# Patient Record
Sex: FEMALE | Race: WHITE | NOT HISPANIC OR LATINO
[De-identification: names, ages, dates, MRNs, and addresses within clinical notes are randomized per-mention and may not be internally consistent; named-entity substitution may affect disease eponyms.]

---

## 2017-03-01 ENCOUNTER — APPOINTMENT (OUTPATIENT)
Dept: MAMMOGRAPHY | Facility: CLINIC | Age: 62
End: 2017-03-01

## 2017-03-01 ENCOUNTER — APPOINTMENT (OUTPATIENT)
Dept: ULTRASOUND IMAGING | Facility: CLINIC | Age: 62
End: 2017-03-01

## 2017-03-01 ENCOUNTER — OUTPATIENT (OUTPATIENT)
Dept: OUTPATIENT SERVICES | Facility: HOSPITAL | Age: 62
LOS: 1 days | End: 2017-03-01
Payer: COMMERCIAL

## 2017-03-01 DIAGNOSIS — Z00.8 ENCOUNTER FOR OTHER GENERAL EXAMINATION: ICD-10-CM

## 2017-03-01 PROCEDURE — 76641 ULTRASOUND BREAST COMPLETE: CPT

## 2017-03-01 PROCEDURE — 77063 BREAST TOMOSYNTHESIS BI: CPT

## 2017-03-01 PROCEDURE — 77067 SCR MAMMO BI INCL CAD: CPT

## 2017-05-30 ENCOUNTER — OUTPATIENT (OUTPATIENT)
Dept: OUTPATIENT SERVICES | Facility: HOSPITAL | Age: 62
LOS: 1 days | End: 2017-05-30
Payer: COMMERCIAL

## 2017-05-30 ENCOUNTER — APPOINTMENT (OUTPATIENT)
Dept: RADIOLOGY | Facility: CLINIC | Age: 62
End: 2017-05-30

## 2017-05-30 DIAGNOSIS — Z00.8 ENCOUNTER FOR OTHER GENERAL EXAMINATION: ICD-10-CM

## 2017-05-30 PROCEDURE — 77080 DXA BONE DENSITY AXIAL: CPT

## 2017-05-30 PROCEDURE — 77086 VRT FRACTURE ASSMT VIA DXA: CPT

## 2017-06-06 ENCOUNTER — OUTPATIENT (OUTPATIENT)
Dept: OUTPATIENT SERVICES | Facility: HOSPITAL | Age: 62
LOS: 1 days | End: 2017-06-06
Payer: COMMERCIAL

## 2017-06-06 ENCOUNTER — APPOINTMENT (OUTPATIENT)
Dept: RADIOLOGY | Facility: CLINIC | Age: 62
End: 2017-06-06

## 2017-06-06 DIAGNOSIS — Z00.8 ENCOUNTER FOR OTHER GENERAL EXAMINATION: ICD-10-CM

## 2017-06-06 PROCEDURE — 71110 X-RAY EXAM RIBS BIL 3 VIEWS: CPT

## 2017-06-06 PROCEDURE — 71046 X-RAY EXAM CHEST 2 VIEWS: CPT

## 2017-06-12 DIAGNOSIS — Z00.00 ENCOUNTER FOR GENERAL ADULT MEDICAL EXAMINATION WITHOUT ABNORMAL FINDINGS: ICD-10-CM

## 2017-06-12 DIAGNOSIS — M81.0 AGE-RELATED OSTEOPOROSIS WITHOUT CURRENT PATHOLOGICAL FRACTURE: ICD-10-CM

## 2017-06-12 DIAGNOSIS — R07.81 PLEURODYNIA: ICD-10-CM

## 2017-07-20 ENCOUNTER — OUTPATIENT (OUTPATIENT)
Dept: OUTPATIENT SERVICES | Facility: HOSPITAL | Age: 62
LOS: 1 days | Discharge: HOME | End: 2017-07-20
Payer: COMMERCIAL

## 2017-07-20 ENCOUNTER — RESULT REVIEW (OUTPATIENT)
Age: 62
End: 2017-07-20

## 2017-07-20 VITALS
TEMPERATURE: 97 F | WEIGHT: 110.89 LBS | DIASTOLIC BLOOD PRESSURE: 77 MMHG | SYSTOLIC BLOOD PRESSURE: 123 MMHG | OXYGEN SATURATION: 100 % | HEIGHT: 62 IN | RESPIRATION RATE: 16 BRPM | HEART RATE: 70 BPM

## 2017-07-20 DIAGNOSIS — Z98.890 OTHER SPECIFIED POSTPROCEDURAL STATES: Chronic | ICD-10-CM

## 2017-07-20 DIAGNOSIS — Z90.710 ACQUIRED ABSENCE OF BOTH CERVIX AND UTERUS: Chronic | ICD-10-CM

## 2017-07-20 PROCEDURE — 88305 TISSUE EXAM BY PATHOLOGIST: CPT | Mod: 26

## 2017-07-20 RX ORDER — SODIUM CHLORIDE 9 MG/ML
1000 INJECTION INTRAMUSCULAR; INTRAVENOUS; SUBCUTANEOUS
Qty: 0 | Refills: 0 | Status: DISCONTINUED | OUTPATIENT
Start: 2017-07-20 | End: 2017-08-04

## 2017-07-20 RX ORDER — FLUTICASONE PROPIONATE AND SALMETEROL 50; 250 UG/1; UG/1
1 POWDER ORAL; RESPIRATORY (INHALATION)
Qty: 0 | Refills: 0 | COMMUNITY

## 2017-07-20 RX ADMIN — SODIUM CHLORIDE 75 MILLILITER(S): 9 INJECTION INTRAMUSCULAR; INTRAVENOUS; SUBCUTANEOUS at 08:26

## 2017-07-20 NOTE — ASU PATIENT PROFILE, ADULT - VISION (WITH CORRECTIVE LENSES IF THE PATIENT USUALLY WEARS THEM):
reading and driving glasses/Normal vision: sees adequately in most situations; can see medication labels, newsprint

## 2017-07-21 LAB — SURGICAL PATHOLOGY FINAL REPORT - CH: SIGNIFICANT CHANGE UP

## 2017-07-25 DIAGNOSIS — Z88.5 ALLERGY STATUS TO NARCOTIC AGENT: ICD-10-CM

## 2017-07-25 DIAGNOSIS — K59.00 CONSTIPATION, UNSPECIFIED: ICD-10-CM

## 2017-07-25 DIAGNOSIS — Z87.891 PERSONAL HISTORY OF NICOTINE DEPENDENCE: ICD-10-CM

## 2017-07-25 DIAGNOSIS — J45.909 UNSPECIFIED ASTHMA, UNCOMPLICATED: ICD-10-CM

## 2017-07-25 DIAGNOSIS — D12.0 BENIGN NEOPLASM OF CECUM: ICD-10-CM

## 2017-07-25 DIAGNOSIS — J44.9 CHRONIC OBSTRUCTIVE PULMONARY DISEASE, UNSPECIFIED: ICD-10-CM

## 2017-07-25 DIAGNOSIS — G43.909 MIGRAINE, UNSPECIFIED, NOT INTRACTABLE, WITHOUT STATUS MIGRAINOSUS: ICD-10-CM

## 2017-07-25 DIAGNOSIS — K64.8 OTHER HEMORRHOIDS: ICD-10-CM

## 2017-09-08 ENCOUNTER — APPOINTMENT (OUTPATIENT)
Dept: COLORECTAL SURGERY | Facility: CLINIC | Age: 62
End: 2017-09-08
Payer: COMMERCIAL

## 2017-09-08 VITALS
BODY MASS INDEX: 19.69 KG/M2 | DIASTOLIC BLOOD PRESSURE: 69 MMHG | HEIGHT: 62 IN | RESPIRATION RATE: 14 BRPM | WEIGHT: 107 LBS | HEART RATE: 67 BPM | SYSTOLIC BLOOD PRESSURE: 102 MMHG | TEMPERATURE: 97.2 F

## 2017-09-08 DIAGNOSIS — K90.49 MALABSORPTION DUE TO INTOLERANCE, NOT ELSEWHERE CLASSIFIED: ICD-10-CM

## 2017-09-08 DIAGNOSIS — Z87.19 PERSONAL HISTORY OF OTHER DISEASES OF THE DIGESTIVE SYSTEM: ICD-10-CM

## 2017-09-08 PROCEDURE — 46221 LIGATION OF HEMORRHOID(S): CPT

## 2017-09-08 PROCEDURE — 99244 OFF/OP CNSLTJ NEW/EST MOD 40: CPT | Mod: 25

## 2017-09-08 RX ORDER — RIZATRIPTAN BENZOATE 10 MG/1
10 TABLET, ORALLY DISINTEGRATING ORAL
Qty: 10 | Refills: 0 | Status: ACTIVE | COMMUNITY
Start: 2017-07-31

## 2017-09-08 RX ORDER — LEVALBUTEROL HYDROCHLORIDE 0.63 MG/3ML
0.63 SOLUTION RESPIRATORY (INHALATION)
Qty: 72 | Refills: 0 | Status: ACTIVE | COMMUNITY
Start: 2017-05-15

## 2017-09-26 ENCOUNTER — APPOINTMENT (OUTPATIENT)
Dept: COLORECTAL SURGERY | Facility: CLINIC | Age: 62
End: 2017-09-26
Payer: COMMERCIAL

## 2017-09-26 VITALS
DIASTOLIC BLOOD PRESSURE: 70 MMHG | RESPIRATION RATE: 14 BRPM | HEART RATE: 70 BPM | SYSTOLIC BLOOD PRESSURE: 110 MMHG | BODY MASS INDEX: 19.69 KG/M2 | TEMPERATURE: 97.1 F | HEIGHT: 62 IN | WEIGHT: 107 LBS

## 2017-09-26 PROCEDURE — 46221 LIGATION OF HEMORRHOID(S): CPT

## 2017-09-26 PROCEDURE — 99214 OFFICE O/P EST MOD 30 MIN: CPT | Mod: 25

## 2018-04-21 ENCOUNTER — APPOINTMENT (OUTPATIENT)
Dept: RADIOLOGY | Facility: CLINIC | Age: 63
End: 2018-04-21
Payer: COMMERCIAL

## 2018-04-21 ENCOUNTER — OUTPATIENT (OUTPATIENT)
Dept: OUTPATIENT SERVICES | Facility: HOSPITAL | Age: 63
LOS: 1 days | End: 2018-04-21
Payer: COMMERCIAL

## 2018-04-21 DIAGNOSIS — Z98.890 OTHER SPECIFIED POSTPROCEDURAL STATES: Chronic | ICD-10-CM

## 2018-04-21 DIAGNOSIS — Z00.8 ENCOUNTER FOR OTHER GENERAL EXAMINATION: ICD-10-CM

## 2018-04-21 DIAGNOSIS — Z90.710 ACQUIRED ABSENCE OF BOTH CERVIX AND UTERUS: Chronic | ICD-10-CM

## 2018-04-21 PROCEDURE — 71046 X-RAY EXAM CHEST 2 VIEWS: CPT | Mod: 26

## 2018-04-21 PROCEDURE — 71046 X-RAY EXAM CHEST 2 VIEWS: CPT

## 2018-04-26 ENCOUNTER — APPOINTMENT (OUTPATIENT)
Dept: ULTRASOUND IMAGING | Facility: CLINIC | Age: 63
End: 2018-04-26
Payer: COMMERCIAL

## 2018-04-26 ENCOUNTER — OUTPATIENT (OUTPATIENT)
Dept: OUTPATIENT SERVICES | Facility: HOSPITAL | Age: 63
LOS: 1 days | End: 2018-04-26
Payer: COMMERCIAL

## 2018-04-26 ENCOUNTER — APPOINTMENT (OUTPATIENT)
Dept: MAMMOGRAPHY | Facility: CLINIC | Age: 63
End: 2018-04-26
Payer: COMMERCIAL

## 2018-04-26 DIAGNOSIS — R10.32 LEFT LOWER QUADRANT PAIN: ICD-10-CM

## 2018-04-26 DIAGNOSIS — R92.2 INCONCLUSIVE MAMMOGRAM: ICD-10-CM

## 2018-04-26 DIAGNOSIS — Z12.31 ENCOUNTER FOR SCREENING MAMMOGRAM FOR MALIGNANT NEOPLASM OF BREAST: ICD-10-CM

## 2018-04-26 DIAGNOSIS — Z00.8 ENCOUNTER FOR OTHER GENERAL EXAMINATION: ICD-10-CM

## 2018-04-26 DIAGNOSIS — Z90.710 ACQUIRED ABSENCE OF BOTH CERVIX AND UTERUS: Chronic | ICD-10-CM

## 2018-04-26 DIAGNOSIS — Z98.890 OTHER SPECIFIED POSTPROCEDURAL STATES: Chronic | ICD-10-CM

## 2018-04-26 PROCEDURE — 77063 BREAST TOMOSYNTHESIS BI: CPT | Mod: 26

## 2018-04-26 PROCEDURE — 76830 TRANSVAGINAL US NON-OB: CPT

## 2018-04-26 PROCEDURE — 76641 ULTRASOUND BREAST COMPLETE: CPT | Mod: 26,50

## 2018-04-26 PROCEDURE — 77067 SCR MAMMO BI INCL CAD: CPT

## 2018-04-26 PROCEDURE — 76641 ULTRASOUND BREAST COMPLETE: CPT

## 2018-04-26 PROCEDURE — 77067 SCR MAMMO BI INCL CAD: CPT | Mod: 26

## 2018-04-26 PROCEDURE — 76830 TRANSVAGINAL US NON-OB: CPT | Mod: 26

## 2018-04-26 PROCEDURE — 77063 BREAST TOMOSYNTHESIS BI: CPT

## 2018-05-03 ENCOUNTER — APPOINTMENT (OUTPATIENT)
Dept: OBGYN | Facility: CLINIC | Age: 63
End: 2018-05-03

## 2018-06-08 ENCOUNTER — APPOINTMENT (OUTPATIENT)
Dept: COLORECTAL SURGERY | Facility: CLINIC | Age: 63
End: 2018-06-08
Payer: COMMERCIAL

## 2018-06-08 VITALS
DIASTOLIC BLOOD PRESSURE: 70 MMHG | HEIGHT: 62 IN | BODY MASS INDEX: 19.88 KG/M2 | RESPIRATION RATE: 14 BRPM | SYSTOLIC BLOOD PRESSURE: 121 MMHG | HEART RATE: 60 BPM | WEIGHT: 108 LBS

## 2018-06-08 DIAGNOSIS — K64.8 OTHER HEMORRHOIDS: ICD-10-CM

## 2018-06-08 PROCEDURE — 46221 LIGATION OF HEMORRHOID(S): CPT

## 2018-06-08 PROCEDURE — 99214 OFFICE O/P EST MOD 30 MIN: CPT | Mod: 25

## 2018-06-14 PROBLEM — K64.8 INTERNAL AND EXTERNAL PROLAPSED HEMORRHOIDS: Status: ACTIVE | Noted: 2017-09-08

## 2018-07-06 ENCOUNTER — APPOINTMENT (OUTPATIENT)
Dept: COLORECTAL SURGERY | Facility: CLINIC | Age: 63
End: 2018-07-06
Payer: COMMERCIAL

## 2018-07-06 VITALS
HEART RATE: 60 BPM | HEIGHT: 62 IN | RESPIRATION RATE: 14 BRPM | WEIGHT: 108 LBS | DIASTOLIC BLOOD PRESSURE: 79 MMHG | SYSTOLIC BLOOD PRESSURE: 120 MMHG | BODY MASS INDEX: 19.88 KG/M2

## 2018-07-06 PROCEDURE — 46600 DIAGNOSTIC ANOSCOPY SPX: CPT

## 2018-07-06 PROCEDURE — 99214 OFFICE O/P EST MOD 30 MIN: CPT | Mod: 25

## 2019-10-04 PROBLEM — J45.909 UNSPECIFIED ASTHMA, UNCOMPLICATED: Chronic | Status: ACTIVE | Noted: 2017-07-20

## 2019-10-09 ENCOUNTER — APPOINTMENT (OUTPATIENT)
Dept: ULTRASOUND IMAGING | Facility: CLINIC | Age: 64
End: 2019-10-09
Payer: COMMERCIAL

## 2019-10-09 ENCOUNTER — APPOINTMENT (OUTPATIENT)
Dept: MAMMOGRAPHY | Facility: CLINIC | Age: 64
End: 2019-10-09
Payer: COMMERCIAL

## 2019-10-09 ENCOUNTER — OUTPATIENT (OUTPATIENT)
Dept: OUTPATIENT SERVICES | Facility: HOSPITAL | Age: 64
LOS: 1 days | End: 2019-10-09
Payer: COMMERCIAL

## 2019-10-09 DIAGNOSIS — Z90.710 ACQUIRED ABSENCE OF BOTH CERVIX AND UTERUS: Chronic | ICD-10-CM

## 2019-10-09 DIAGNOSIS — Z98.890 OTHER SPECIFIED POSTPROCEDURAL STATES: Chronic | ICD-10-CM

## 2019-10-09 DIAGNOSIS — R92.8 OTHER ABNORMAL AND INCONCLUSIVE FINDINGS ON DIAGNOSTIC IMAGING OF BREAST: ICD-10-CM

## 2019-10-09 PROCEDURE — 77080 DXA BONE DENSITY AXIAL: CPT | Mod: 26

## 2019-10-09 PROCEDURE — 77080 DXA BONE DENSITY AXIAL: CPT

## 2019-10-09 PROCEDURE — 77067 SCR MAMMO BI INCL CAD: CPT

## 2019-10-09 PROCEDURE — 76641 ULTRASOUND BREAST COMPLETE: CPT | Mod: 26,50

## 2019-10-09 PROCEDURE — 77063 BREAST TOMOSYNTHESIS BI: CPT | Mod: 26

## 2019-10-09 PROCEDURE — 77067 SCR MAMMO BI INCL CAD: CPT | Mod: 26

## 2019-10-09 PROCEDURE — 76641 ULTRASOUND BREAST COMPLETE: CPT

## 2019-10-09 PROCEDURE — 77063 BREAST TOMOSYNTHESIS BI: CPT

## 2020-01-21 ENCOUNTER — TRANSCRIPTION ENCOUNTER (OUTPATIENT)
Age: 65
End: 2020-01-21

## 2020-07-06 ENCOUNTER — APPOINTMENT (OUTPATIENT)
Dept: OBGYN | Facility: CLINIC | Age: 65
End: 2020-07-06
Payer: COMMERCIAL

## 2020-07-06 VITALS
HEIGHT: 62 IN | WEIGHT: 118 LBS | TEMPERATURE: 98.2 F | BODY MASS INDEX: 21.71 KG/M2 | SYSTOLIC BLOOD PRESSURE: 124 MMHG | HEART RATE: 54 BPM | DIASTOLIC BLOOD PRESSURE: 77 MMHG

## 2020-07-06 PROCEDURE — 99214 OFFICE O/P EST MOD 30 MIN: CPT

## 2020-07-06 RX ORDER — IVERMECTIN 3 MG/1
3 TABLET ORAL
Qty: 3 | Refills: 0 | Status: DISCONTINUED | COMMUNITY
Start: 2017-11-16 | End: 2020-07-06

## 2020-07-06 RX ORDER — PERMETHRIN 50 MG/G
5 CREAM TOPICAL
Qty: 60 | Refills: 0 | Status: DISCONTINUED | COMMUNITY
Start: 2017-11-09 | End: 2020-07-06

## 2020-07-06 RX ORDER — TRIMETHOBENZAMIDE HYDROCHLORIDE 300 MG/1
300 CAPSULE ORAL
Qty: 1 | Refills: 0 | Status: DISCONTINUED | COMMUNITY
Start: 2017-07-19 | End: 2020-07-06

## 2020-07-06 RX ORDER — POLYETHYLENE GLYCOL 3350, SODIUM CHLORIDE, SODIUM BICARBONATE AND POTASSIUM CHLORIDE WITH LEMON FLAVOR 420; 11.2; 5.72; 1.48 G/4L; G/4L; G/4L; G/4L
420 POWDER, FOR SOLUTION ORAL
Qty: 4000 | Refills: 0 | Status: DISCONTINUED | COMMUNITY
Start: 2017-06-19 | End: 2020-07-06

## 2020-07-06 NOTE — PHYSICAL EXAM
[No Lesions] : no genitalia lesions [Normal] : external genitalia [Labia Majora] : labia major [No Bleeding] : there was no active vaginal bleeding [Atrophy] : atrophy [Labia Minora] : labia minora

## 2020-07-08 LAB
BACTERIA UR CULT: NORMAL
C TRACH RRNA SPEC QL NAA+PROBE: NOT DETECTED
CANDIDA VAG CYTO: NOT DETECTED
G VAGINALIS+PREV SP MTYP VAG QL MICRO: NOT DETECTED
N GONORRHOEA RRNA SPEC QL NAA+PROBE: NOT DETECTED
SOURCE AMPLIFICATION: NORMAL
T VAGINALIS VAG QL WET PREP: NOT DETECTED

## 2020-09-15 ENCOUNTER — APPOINTMENT (OUTPATIENT)
Dept: OBGYN | Facility: CLINIC | Age: 65
End: 2020-09-15
Payer: COMMERCIAL

## 2020-09-15 ENCOUNTER — ASOB RESULT (OUTPATIENT)
Age: 65
End: 2020-09-15

## 2020-09-15 VITALS
HEART RATE: 60 BPM | BODY MASS INDEX: 21.71 KG/M2 | DIASTOLIC BLOOD PRESSURE: 76 MMHG | SYSTOLIC BLOOD PRESSURE: 116 MMHG | TEMPERATURE: 97.6 F | WEIGHT: 118 LBS | HEIGHT: 62 IN

## 2020-09-15 DIAGNOSIS — B00.9 HERPESVIRAL INFECTION, UNSPECIFIED: ICD-10-CM

## 2020-09-15 DIAGNOSIS — M81.0 AGE-RELATED OSTEOPOROSIS W/OUT CURRENT PATHOLOGICAL FRACTURE: ICD-10-CM

## 2020-09-15 DIAGNOSIS — N95.2 POSTMENOPAUSAL ATROPHIC VAGINITIS: ICD-10-CM

## 2020-09-15 DIAGNOSIS — R92.2 INCONCLUSIVE MAMMOGRAM: ICD-10-CM

## 2020-09-15 PROCEDURE — 76830 TRANSVAGINAL US NON-OB: CPT

## 2020-09-15 PROCEDURE — 99396 PREV VISIT EST AGE 40-64: CPT

## 2020-09-15 PROCEDURE — 99213 OFFICE O/P EST LOW 20 MIN: CPT | Mod: 25

## 2020-09-15 NOTE — COUNSELING
[Nutrition/ Exercise/ Weight Management] : nutrition, exercise, weight management [Breast Self Exam] : breast self exam [Other ___] : [unfilled]

## 2020-09-15 NOTE — HISTORY OF PRESENT ILLNESS
[Mammogramdate] : 2019 [TextBox_4] : 64 yo menopausal .\par no PMB\par sexually active\par recurrent Herpes - reluctant to take suppressive Valtrex therapy. Treating with vitamins at this time. Still getting occasional lesions. [BreastSonogramDate] : 2019 [TextBox_19] : dense [TextBox_37] : osteoporosis [PapSmeardate] : 2015 [BoneDensityDate] : 2019 [ColonoscopyDate] : 2018

## 2020-09-15 NOTE — DISCUSSION/SUMMARY
[FreeTextEntry1] : WWV\par PAP\par Mammo/US\par Osteoporosis - refer to Dr Nugent\par Discussed recurrent Herpes and now agrees to take suppressive Rx Vatrex. Has prescription.\par GYN fu 1 y

## 2020-09-15 NOTE — PHYSICAL EXAM
[Examination Of The Breasts] : a normal appearance [No Masses] : no breast masses were palpable [Vulvar Atrophy] : vulvar atrophy [Labia Minora] : normal [Labia Majora] : normal [Atrophy] : atrophy [Normal] : normal [Uterine Adnexae] : normal

## 2020-09-17 LAB — HPV HIGH+LOW RISK DNA PNL CVX: NOT DETECTED

## 2020-09-19 LAB — CYTOLOGY CVX/VAG DOC THIN PREP: ABNORMAL

## 2020-10-25 ENCOUNTER — TRANSCRIPTION ENCOUNTER (OUTPATIENT)
Age: 65
End: 2020-10-25

## 2021-07-01 ENCOUNTER — APPOINTMENT (OUTPATIENT)
Dept: OTOLARYNGOLOGY | Facility: CLINIC | Age: 66
End: 2021-07-01
Payer: COMMERCIAL

## 2021-07-01 VITALS
WEIGHT: 113 LBS | DIASTOLIC BLOOD PRESSURE: 75 MMHG | HEIGHT: 62 IN | HEART RATE: 62 BPM | BODY MASS INDEX: 20.8 KG/M2 | SYSTOLIC BLOOD PRESSURE: 117 MMHG

## 2021-07-01 DIAGNOSIS — J32.2 CHRONIC ETHMOIDAL SINUSITIS: ICD-10-CM

## 2021-07-01 PROCEDURE — 99244 OFF/OP CNSLTJ NEW/EST MOD 40: CPT | Mod: 25

## 2021-07-01 PROCEDURE — 31231 NASAL ENDOSCOPY DX: CPT

## 2021-07-01 PROCEDURE — 99072 ADDL SUPL MATRL&STAF TM PHE: CPT

## 2021-07-01 NOTE — HISTORY OF PRESENT ILLNESS
[de-identified] : 65 year old female referred by Dr Marco A Villa, ENT for CRSwNP\par Hx of 9 prior sinus surgeries-- Dr Escobedo 2x, Dr Bernal 2-3x, Dr. Clarke 2x\par First surgery 30yr ago, most recent 4-5yr ago\par Reports constant complete nasal congestion, clear anterior rhinorrhea and PND, intermittent facial pain andpressure\par No sense of smell\par Reports occasional migraines, taking Rizatriptan with relief but thinks its causing short term memory issues\par Unsure of recent sinus infections\par Denies use of nasal sprays, states they give her headaches\par \par Last CT Sinus 06/04/2019-- report reviewed-- "Impression: s/p extensive ESS. Complete opacification of bilateral frontal and postsurgical ethmoid sinuses with hyperdense central secretions suggestive of inspissated secretions of allergic fungal sinusitis. Complete soft tissue obstruction of the bilateral frontal drainage pathways. Mild mucosal thickening of aerated bilateral maxillary and right sphenoid sinus. Posterior left nasal cavity 1.1cm polyp or redundant mucosal thickening, narrowing the posterior left nasal passageway without obstruction."\par \par Dx as an adult-- takes advair 2x/day-- does not have a pulmonologist\par Unsure about NSAID tolerance\par Increased nasal congestion with alcohol intake\par Also w osteoporosis which she attributes to long hx of PO steroid prescription\par \par Reviewed several prior ENT notes, Ct reports, labs\par Elevated Eos noted\par \par PMH: asthma\par PSH: hysterectomy\par \par Works as realtor

## 2021-07-01 NOTE — REASON FOR VISIT
[Initial Consultation] : an initial consultation for [Other: _____] : [unfilled] [FreeTextEntry2] : referred by Dr Marco A Villa, ENT for  nasal polyps

## 2021-07-05 ENCOUNTER — NON-APPOINTMENT (OUTPATIENT)
Age: 66
End: 2021-07-05

## 2021-07-07 ENCOUNTER — APPOINTMENT (OUTPATIENT)
Dept: MAMMOGRAPHY | Facility: CLINIC | Age: 66
End: 2021-07-07
Payer: COMMERCIAL

## 2021-07-07 ENCOUNTER — OUTPATIENT (OUTPATIENT)
Dept: OUTPATIENT SERVICES | Facility: HOSPITAL | Age: 66
LOS: 1 days | End: 2021-07-07
Payer: COMMERCIAL

## 2021-07-07 ENCOUNTER — APPOINTMENT (OUTPATIENT)
Dept: ULTRASOUND IMAGING | Facility: CLINIC | Age: 66
End: 2021-07-07
Payer: COMMERCIAL

## 2021-07-07 ENCOUNTER — APPOINTMENT (OUTPATIENT)
Dept: RADIOLOGY | Facility: CLINIC | Age: 66
End: 2021-07-07
Payer: COMMERCIAL

## 2021-07-07 DIAGNOSIS — Z98.890 OTHER SPECIFIED POSTPROCEDURAL STATES: Chronic | ICD-10-CM

## 2021-07-07 DIAGNOSIS — Z90.710 ACQUIRED ABSENCE OF BOTH CERVIX AND UTERUS: Chronic | ICD-10-CM

## 2021-07-07 DIAGNOSIS — R92.8 OTHER ABNORMAL AND INCONCLUSIVE FINDINGS ON DIAGNOSTIC IMAGING OF BREAST: ICD-10-CM

## 2021-07-07 DIAGNOSIS — Z13.820 ENCOUNTER FOR SCREENING FOR OSTEOPOROSIS: ICD-10-CM

## 2021-07-07 PROCEDURE — 77067 SCR MAMMO BI INCL CAD: CPT | Mod: 26

## 2021-07-07 PROCEDURE — 76641 ULTRASOUND BREAST COMPLETE: CPT

## 2021-07-07 PROCEDURE — 77063 BREAST TOMOSYNTHESIS BI: CPT | Mod: 26

## 2021-07-07 PROCEDURE — 77067 SCR MAMMO BI INCL CAD: CPT

## 2021-07-07 PROCEDURE — 77063 BREAST TOMOSYNTHESIS BI: CPT

## 2021-07-07 PROCEDURE — 76641 ULTRASOUND BREAST COMPLETE: CPT | Mod: 26,50

## 2021-07-12 ENCOUNTER — APPOINTMENT (OUTPATIENT)
Dept: CT IMAGING | Facility: CLINIC | Age: 66
End: 2021-07-12
Payer: COMMERCIAL

## 2021-07-12 ENCOUNTER — OUTPATIENT (OUTPATIENT)
Dept: OUTPATIENT SERVICES | Facility: HOSPITAL | Age: 66
LOS: 1 days | End: 2021-07-12
Payer: COMMERCIAL

## 2021-07-12 DIAGNOSIS — J32.1 CHRONIC FRONTAL SINUSITIS: ICD-10-CM

## 2021-07-12 DIAGNOSIS — Z98.890 OTHER SPECIFIED POSTPROCEDURAL STATES: Chronic | ICD-10-CM

## 2021-07-12 DIAGNOSIS — Z00.8 ENCOUNTER FOR OTHER GENERAL EXAMINATION: ICD-10-CM

## 2021-07-12 DIAGNOSIS — Z90.710 ACQUIRED ABSENCE OF BOTH CERVIX AND UTERUS: Chronic | ICD-10-CM

## 2021-07-12 PROCEDURE — 70486 CT MAXILLOFACIAL W/O DYE: CPT | Mod: 26

## 2021-07-12 PROCEDURE — 70486 CT MAXILLOFACIAL W/O DYE: CPT

## 2021-07-26 ENCOUNTER — NON-APPOINTMENT (OUTPATIENT)
Age: 66
End: 2021-07-26

## 2021-07-26 ENCOUNTER — LABORATORY RESULT (OUTPATIENT)
Age: 66
End: 2021-07-26

## 2021-07-26 ENCOUNTER — APPOINTMENT (OUTPATIENT)
Dept: PEDIATRIC ALLERGY IMMUNOLOGY | Facility: CLINIC | Age: 66
End: 2021-07-26
Payer: COMMERCIAL

## 2021-07-26 VITALS — BODY MASS INDEX: 21.18 KG/M2 | OXYGEN SATURATION: 95 % | WEIGHT: 115.79 LBS | HEART RATE: 65 BPM

## 2021-07-26 DIAGNOSIS — T78.1XXA OTHER ADVERSE FOOD REACTIONS, NOT ELSEWHERE CLASSIFIED, INITIAL ENCOUNTER: ICD-10-CM

## 2021-07-26 DIAGNOSIS — R05 COUGH: ICD-10-CM

## 2021-07-26 PROCEDURE — 99244 OFF/OP CNSLTJ NEW/EST MOD 40: CPT | Mod: 25

## 2021-07-26 PROCEDURE — 94060 EVALUATION OF WHEEZING: CPT

## 2021-07-26 PROCEDURE — 36415 COLL VENOUS BLD VENIPUNCTURE: CPT

## 2021-07-26 RX ORDER — MONTELUKAST 10 MG/1
10 TABLET, FILM COATED ORAL
Qty: 30 | Refills: 0 | Status: COMPLETED | COMMUNITY
Start: 2020-11-03

## 2021-07-26 RX ORDER — FLUTICASONE PROPIONATE AND SALMETEROL 250; 50 UG/1; UG/1
250-50 POWDER RESPIRATORY (INHALATION)
Qty: 60 | Refills: 0 | Status: ACTIVE | COMMUNITY
Start: 2021-06-08

## 2021-07-26 RX ORDER — VALACYCLOVIR 500 MG/1
500 TABLET, FILM COATED ORAL DAILY
Qty: 1 | Refills: 1 | Status: DISCONTINUED | COMMUNITY
Start: 2020-07-06 | End: 2021-07-26

## 2021-07-26 RX ORDER — EPINEPHRINE 0.3 MG/.3ML
0.3 INJECTION INTRAMUSCULAR
Qty: 2 | Refills: 0 | Status: ACTIVE | COMMUNITY
Start: 2021-02-23

## 2021-07-26 NOTE — CONSULT LETTER
[Dear  ___] : Dear  [unfilled], [Consult Letter:] : I had the pleasure of evaluating your patient, [unfilled]. [Please see my note below.] : Please see my note below. [Consult Closing:] : Thank you very much for allowing me to participate in the care of this patient.  If you have any questions, please do not hesitate to contact me. [Sincerely,] : Sincerely, [DrTonny  ___] : Dr. YANG [FreeTextEntry3] : Kathleen Vogel MD\par Fellow, Division of Allergy and Immunology.

## 2021-07-26 NOTE — SOCIAL HISTORY
[House] : [unfilled] lives in a house  [Radiator/Baseboard] : heating provided by radiator(s)/baseboard(s) [Central] : air conditioning provided by central unit [Damp/Musty] : damp/musty [Dust Mite Covers] : has dust mite covers [Humidifier] : does not use a humidifier [Dehumidifier] : does not use a dehumidifier [Cockroaches] : Patient states that there are no cockroaches in the home [Feather Pillows] : does not have feather pillows [Feather Comforter] : does not have a feather comforter [Bedroom] : not in the bedroom [Basement] : not in the basement [Living Area] : not in the living area [Smokers in Household] : there are no smokers in the home [de-identified] : Cockroach, Mold,  [de-identified] : 3 dogs  [de-identified] : Fragrance free

## 2021-07-26 NOTE — PHYSICAL EXAM
[Alert] : alert [Well Nourished] : well nourished [Healthy Appearance] : healthy appearance [No Acute Distress] : no acute distress [Well Developed] : well developed [Normal Pupil & Iris Size/Symmetry] : normal pupil and iris size and symmetry [No Discharge] : no discharge [No Photophobia] : no photophobia [Sclera Not Icteric] : sclera not icteric [Suborbital Bogginess] : suborbital bogginess (allergic shiners) [Normal TMs] : both tympanic membranes were normal [Normal Nasal Mucosa] : the nasal mucosa was normal [Normal Lips/Tongue] : the lips and tongue were normal [Normal Outer Ear/Nose] : the ears and nose were normal in appearance [Normal Tonsils] : normal tonsils [No Thrush] : no thrush [Boggy Nasal Turbinates] : boggy and/or pale nasal turbinates [Supple] : the neck was supple [Normal Rate and Effort] : normal respiratory rhythm and effort [No Crackles] : no crackles [No Retractions] : no retractions [Bilateral Audible Breath Sounds] : bilateral audible breath sounds [Normal Rate] : heart rate was normal  [Normal S1, S2] : normal S1 and S2 [No murmur] : no murmur [Regular Rhythm] : with a regular rhythm [Soft] : abdomen soft [Not Tender] : non-tender [Not Distended] : not distended [No HSM] : no hepato-splenomegaly [Normal Cervical Lymph Nodes] : cervical [Skin Intact] : skin intact  [No Rash] : no rash [No Skin Lesions] : no skin lesions [No Edema] : no edema [No clubbing] : no clubbing [No Cyanosis] : no cyanosis [Normal Mood] : mood was normal [Normal Affect] : affect was normal [Alert, Awake, Oriented as Age-Appropriate] : alert, awake, oriented as age appropriate [Conjunctival Erythema] : no conjunctival erythema [Pale mucosa] : no pale mucosa [Pharyngeal erythema] : no pharyngeal erythema [Exudate] : no exudate [Clear Rhinorrhea] : no clear rhinorrhea was seen [Wheezing] : no wheezing was heard [de-identified] : irritate nasal mucosa

## 2021-07-26 NOTE — HISTORY OF PRESENT ILLNESS
[(# ___ in the past year)] : [unfilled] visits to the emergency room in the past year [( # ___ in the past year)] : intubated [unfilled] times in the past year [0 x/month] : 0 x/month [None] : None [< or = 2 days/wk] : < than or = 2 days/week [0 - 1/year] : 0 - 1/year [Eczematous rashes] : eczematous rashes [Venom Reactions] : venom reactions [Food Allergies] : food allergies [> or = 20] : > than or = 20 [de-identified] : BRIANA RAZA is a 65 year old White female with history of [chronic rhinosinusitis with nasal polyposis, asthma and NSAIDs allergy] who presents for evaluation of Aspirin Exacerbated Respiratory Disease (AERD). Patient was referred by Dr Jose. \par \par History of nasal polyposis:\par She has had 7 or 9 (not sure) sinus surgeries; most recent surgery was 4-5 years ago. Over the last year she did receive 1 courses of steroids for allergic reaction to flea bites.\par Prior to the surgeries, she had nasal congestion, runny nose, not being able breath, anosmia, and no sense of taste. Symptoms improve after each surgery, but only lasts about 4-5 years. She has a history of fungal sinus infection in the past, but no sinusitis within the past year. She has never taken aspirin before. She stopped taking Motrin 5-8 years ago because she researched that it could exacerbate nasal polyps. She tolerates tylenol., Maxol. After sips of champagne a few weeks ago, she becomes a little flushed and congestion, but not every time. \par \par History of asthma:\par She was diagnosed with asthma at about 37 - 42 years of age.  she currently takes Advair 250 BID for asthma. Albuterol at home (uses twice a year). Denies taking steroids in the last year. \par \par FOOD AVOIDANCE\par - Dairy - stomach upset, bloated, started around the same time as asthma, no diarrhea. no SOB\par - Gluten free diet, doesn't like to eat meat, avoids soy and peanut as personal choice, not due to adverse reactions \par - tolerates wheat, eggs, tree nuts.\par - Shellfish(lobster, crab) - tongue swelling, but eats shrimp, \par \par ENVIRONMENTAL ALLERGY\par - patient tested positive for mold and cockroach with her previous allergist. She is currently on budesonide nasal rinse, prescirbed from ENT, and her symptoms have been better. \par \par SNOT-22 = 49. \par COVID vaccination\par Pfizer - 03/17, 04/7 [de-identified] : ICU in late 40s.  [FreeTextEntry7] : 22

## 2021-07-26 NOTE — REVIEW OF SYSTEMS
[Rhinorrhea] : rhinorrhea [Nasal Congestion] : nasal congestion [Sneezing] : sneezing [Recurrent Sinus Infections] : recurrent sinus infections [Nl] : Genitourinary [Immunizations are up to date] : Immunizations are up to date [FreeTextEntry4] : Anosmia, loss of sense of taste

## 2021-07-27 LAB
ALBUMIN SERPL ELPH-MCNC: 4.7 G/DL
ALP BLD-CCNC: 57 U/L
ALT SERPL-CCNC: 11 U/L
ANION GAP SERPL CALC-SCNC: 14 MMOL/L
AST SERPL-CCNC: 15 U/L
BASOPHILS # BLD AUTO: 0.06 K/UL
BASOPHILS NFR BLD AUTO: 0.8 %
BILIRUB SERPL-MCNC: 0.4 MG/DL
BUN SERPL-MCNC: 16 MG/DL
CALCIUM SERPL-MCNC: 9.4 MG/DL
CH50 SERPL-MCNC: <14 U/ML
CHLORIDE SERPL-SCNC: 103 MMOL/L
CO2 SERPL-SCNC: 24 MMOL/L
CREAT SERPL-MCNC: 0.7 MG/DL
DEPRECATED KAPPA LC FREE/LAMBDA SER: 1.12 RATIO
EOSINOPHIL # BLD AUTO: 0.44 K/UL
EOSINOPHIL NFR BLD AUTO: 5.5 %
GLUCOSE SERPL-MCNC: 90 MG/DL
HCT VFR BLD CALC: 41.8 %
HGB BLD-MCNC: 13.7 G/DL
IGA SER QL IEP: 117 MG/DL
IGG SER QL IEP: 717 MG/DL
IGM SER QL IEP: 80 MG/DL
IMM GRANULOCYTES NFR BLD AUTO: 0.3 %
KAPPA LC CSF-MCNC: 1.49 MG/DL
KAPPA LC SERPL-MCNC: 1.67 MG/DL
LYMPHOCYTES # BLD AUTO: 3.33 K/UL
LYMPHOCYTES NFR BLD AUTO: 41.9 %
MAN DIFF?: NORMAL
MCHC RBC-ENTMCNC: 31 PG
MCHC RBC-ENTMCNC: 32.8 GM/DL
MCV RBC AUTO: 94.6 FL
MONOCYTES # BLD AUTO: 0.52 K/UL
MONOCYTES NFR BLD AUTO: 6.5 %
NEUTROPHILS # BLD AUTO: 3.58 K/UL
NEUTROPHILS NFR BLD AUTO: 45 %
PLATELET # BLD AUTO: 229 K/UL
POTASSIUM SERPL-SCNC: 4 MMOL/L
PROT SERPL-MCNC: 6.7 G/DL
RBC # BLD: 4.42 M/UL
RBC # FLD: 12.2 %
SODIUM SERPL-SCNC: 140 MMOL/L
WBC # FLD AUTO: 7.95 K/UL

## 2021-07-29 LAB
A ALTERNATA IGE QN: <0.1 KUA/L
A FUMIGATUS IGE QN: <0.1 KUA/L
C HERBARUM IGE QN: <0.1 KUA/L
C LUNATA IGE QN: <0.1 KUA/L
C TETANI IGG SER-ACNC: 0.74 IU/ML
DEPRECATED A ALTERNATA IGE RAST QL: 0
DEPRECATED A FUMIGATUS IGE RAST QL: 0
DEPRECATED A PULLULANS IGE RAST QL: 0
DEPRECATED C HERBARUM IGE RAST QL: 0
DEPRECATED C LUNATA IGE RAST QL: 0
DEPRECATED F MONILIFORME IGE RAST QL: NORMAL
DEPRECATED LOBSTER IGE RAST QL: 0
DEPRECATED M RACEMOSUS IGE RAST QL: 0
DEPRECATED P NOTATUM IGE RAST QL: 0
DEPRECATED R NIGRICANS IGE RAST QL: 0
DEPRECATED SHRIMP IGE RAST QL: 0
F MONILIFORME IGE QN: 0.11 KUA/L
LOBSTER IGE QN: <0.1 KUA/L
M RACEMOSUS IGE QN: <0.1 KUA/L
MOLD (AUREOBASIDIUM M12) CONC: <0.1 KUA/L
P NOTATUM IGE QN: <0.1 KUA/L
R NIGRICANS IGE QN: <0.1 KUA/L
SCALLOP IGE QN: <0.1 KUA/L
TOTAL IGE SMQN RAST: 47 KU/L

## 2021-08-11 LAB
COMPLEMENT, ALTERNATE PATHWAY (AH50): 47
LEUKOTRIENE E4, URINE: NORMAL
MANNAN BINDING LECTIN (MBL): 349 NG/ML
TRYPTASE: 6.7 UG/L

## 2021-08-17 ENCOUNTER — LABORATORY RESULT (OUTPATIENT)
Age: 66
End: 2021-08-17

## 2021-08-17 ENCOUNTER — APPOINTMENT (OUTPATIENT)
Dept: PEDIATRIC ALLERGY IMMUNOLOGY | Facility: CLINIC | Age: 66
End: 2021-08-17
Payer: COMMERCIAL

## 2021-08-17 VITALS — HEART RATE: 80 BPM | BODY MASS INDEX: 20.85 KG/M2 | WEIGHT: 114 LBS

## 2021-08-17 PROCEDURE — 95004 PERQ TESTS W/ALRGNC XTRCS: CPT

## 2021-08-17 PROCEDURE — 36415 COLL VENOUS BLD VENIPUNCTURE: CPT

## 2021-08-17 PROCEDURE — 99214 OFFICE O/P EST MOD 30 MIN: CPT | Mod: 25

## 2021-08-18 LAB — CH50 SERPL-MCNC: 47 U/ML

## 2021-08-18 NOTE — HISTORY OF PRESENT ILLNESS
[Eczematous rashes] : eczematous rashes [Food Allergies] : food allergies [Venom Reactions] : venom reactions [de-identified] : BRIANA RAZA is a 65 year old female with chronic rhinosinusitis with nasal polyposis ( s/p multiple sinus surgeries: 7-9), poorly controlled asthma, former smoker, who returns for allergy skin testing.\par ENT- Dr Jose. \par \par No interval problems\par - She hasn't taking NSAIDs in years because she read that NSAIDs make nasal polyps worse. She never had reaction to NSAIDs.\par \par History of nasal polyposis:\par She has had 7 or 9 (not sure) sinus surgeries; most recent surgery was 4-5 years ago. Over the last year she did receive 1 courses of steroids for allergic reaction to flea bites.\par Prior to the surgeries, she had nasal congestion, runny nose, not being able breath, anosmia, and no sense of taste. Symptoms improve after each surgery, but only lasts about 4-5 years. She has a history of fungal sinus infection in the past, but no sinusitis within the past year. She has never taken aspirin before. She stopped taking Motrin 5-8 years ago because she researched that it could exacerbate nasal polyps. She tolerates tylenol., Maxol. After sips of champagne a few weeks ago, she becomes a little flushed and congestion, but not every time. \par \par History of asthma:\par She was diagnosed with asthma at about 37 - 42 years of age.  she currently takes Advair 250 BID for asthma. Albuterol at home (uses twice a year). Denies taking steroids in the last year. \par \par FOOD AVOIDANCE\par - Dairy - stomach upset, bloated, started around the same time as asthma, no diarrhea. no SOB\par - Gluten free diet, doesn't like to eat meat, avoids soy and peanut as personal choice, not due to adverse reactions \par - tolerates wheat, eggs, tree nuts.\par - Shellfish(lobster, crab) - tongue swelling, but eats shrimp, \par \par ENVIRONMENTAL ALLERGY\par - patient tested positive for mold and cockroach with her previous allergist. She is currently on budesonide nasal rinse, prescirbed from ENT, and her symptoms have been better. \par \par SNOT-22 = 49. \par COVID vaccination\par Pfizer - 03/17, 04/7

## 2021-08-18 NOTE — REVIEW OF SYSTEMS
[Rhinorrhea] : rhinorrhea [Nasal Congestion] : nasal congestion [Post Nasal Drip] : post nasal drip [Sneezing] : sneezing [Difficulty Breathing] : no dyspnea [SOB at Rest] : no shortness of breath at rest [Cough] : no cough [Wheezing] : no wheezing [Headache] : no headache [Urticaria] : no urticaria [Swelling] : no swelling [Nosebleeds] : no epistaxis [Easy Bruising] : no tendency for easy bruising [Recurrent Sinus Infections] : recurrent sinus infections [Nl] : Musculoskeletal [FreeTextEntry4] : Anosmia, loss of sense of taste

## 2021-08-19 LAB
DEPRECATED DOG DANDER IGE RAST QL: 0
DOG DANDER IGE QN: <0.1 KUA/L

## 2021-08-20 LAB — C TETANI IGG SER-ACNC: 0.62 IU/ML

## 2021-08-25 ENCOUNTER — APPOINTMENT (OUTPATIENT)
Dept: DISASTER EMERGENCY | Facility: CLINIC | Age: 66
End: 2021-08-25

## 2021-08-26 ENCOUNTER — APPOINTMENT (OUTPATIENT)
Dept: DISASTER EMERGENCY | Facility: CLINIC | Age: 66
End: 2021-08-26

## 2021-08-27 ENCOUNTER — NON-APPOINTMENT (OUTPATIENT)
Age: 66
End: 2021-08-27

## 2021-08-27 LAB — SARS-COV-2 N GENE NPH QL NAA+PROBE: NOT DETECTED

## 2021-08-28 LAB
A ALTERNATA IGE QN: <0.1 KUA/L
A FLAVUS AB FLD QL: NEGATIVE
A FUMIGATUS AB FLD QL: NEGATIVE
A FUMIGATUS IGE QN: <0.1 KUA/L
A NIGER AB FLD QL: NEGATIVE
C HERBARUM IGE QN: <0.1 KUA/L
C LUNATA IGE QN: <0.1 KUA/L
DEPRECATED A ALTERNATA IGE RAST QL: 0
DEPRECATED A FUMIGATUS IGE RAST QL: 0
DEPRECATED A PULLULANS IGE RAST QL: 0
DEPRECATED C HERBARUM IGE RAST QL: 0
DEPRECATED C LUNATA IGE RAST QL: 0
DEPRECATED F MONILIFORME IGE RAST QL: 0
DEPRECATED M RACEMOSUS IGE RAST QL: 0
DEPRECATED P NOTATUM IGE RAST QL: 0
DEPRECATED R NIGRICANS IGE RAST QL: 0
DEPRECATED S PNEUM 1 IGG SER-MCNC: 1.2 MCG/ML
DEPRECATED S PNEUM 1 IGG SER-MCNC: 1.6 MCG/ML
DEPRECATED S PNEUM12 AB SER-ACNC: 0.9 MCG/ML
DEPRECATED S PNEUM12 AB SER-ACNC: 1.2 MCG/ML
DEPRECATED S PNEUM14 AB SER-ACNC: 1.1 MCG/ML
DEPRECATED S PNEUM14 AB SER-ACNC: 1.9 MCG/ML
DEPRECATED S PNEUM17 IGG SER IA-MCNC: 0.5 MCG/ML
DEPRECATED S PNEUM17 IGG SER IA-MCNC: 0.7 MCG/ML
DEPRECATED S PNEUM18 IGG SER IA-MCNC: 1.5 MCG/ML
DEPRECATED S PNEUM18 IGG SER IA-MCNC: 1.6 MCG/ML
DEPRECATED S PNEUM19 IGG SER-MCNC: 3.6 MCG/ML
DEPRECATED S PNEUM19 IGG SER-MCNC: 4.6 MCG/ML
DEPRECATED S PNEUM19 IGG SER-MCNC: 4.9 MCG/ML
DEPRECATED S PNEUM19 IGG SER-MCNC: 5.9 MCG/ML
DEPRECATED S PNEUM2 IGG SER-MCNC: 2.5 MCG/ML
DEPRECATED S PNEUM2 IGG SER-MCNC: 3 MCG/ML
DEPRECATED S PNEUM20 IGG SER-MCNC: <0.4 MCG/ML
DEPRECATED S PNEUM20 IGG SER-MCNC: <0.4 MCG/ML
DEPRECATED S PNEUM22 IGG SER-MCNC: 3.7 MCG/ML
DEPRECATED S PNEUM22 IGG SER-MCNC: 4.2 MCG/ML
DEPRECATED S PNEUM23 AB SER-ACNC: 3.6 MCG/ML
DEPRECATED S PNEUM23 AB SER-ACNC: 4.5 MCG/ML
DEPRECATED S PNEUM3 AB SER-ACNC: 1.6 MCG/ML
DEPRECATED S PNEUM3 AB SER-ACNC: 1.7 MCG/ML
DEPRECATED S PNEUM34 IGG SER-MCNC: 0.5 MCG/ML
DEPRECATED S PNEUM34 IGG SER-MCNC: 0.7 MCG/ML
DEPRECATED S PNEUM4 AB SER-ACNC: 0.9 MCG/ML
DEPRECATED S PNEUM4 AB SER-ACNC: 1 MCG/ML
DEPRECATED S PNEUM5 IGG SER-MCNC: 5.6 MCG/ML
DEPRECATED S PNEUM5 IGG SER-MCNC: 7.3 MCG/ML
DEPRECATED S PNEUM6 IGG SER-MCNC: 2.2 MCG/ML
DEPRECATED S PNEUM6 IGG SER-MCNC: 2.4 MCG/ML
DEPRECATED S PNEUM7 IGG SER-ACNC: 0.8 MCG/ML
DEPRECATED S PNEUM7 IGG SER-ACNC: 1.2 MCG/ML
DEPRECATED S PNEUM8 AB SER-ACNC: 0.4 MCG/ML
DEPRECATED S PNEUM8 AB SER-ACNC: <0.4 MCG/ML
DEPRECATED S PNEUM9 AB SER-ACNC: NORMAL
DEPRECATED S PNEUM9 AB SER-ACNC: NORMAL MCG/ML
DEPRECATED S PNEUM9 IGG SER-MCNC: 2.8 MCG/ML
DEPRECATED S PNEUM9 IGG SER-MCNC: 3.4 MCG/ML
F MONILIFORME IGE QN: <0.1 KUA/L
M RACEMOSUS IGE QN: <0.1 KUA/L
MOLD (AUREOBASIDIUM M12) CONC: <0.1 KUA/L
P NOTATUM IGE QN: <0.1 KUA/L
R NIGRICANS IGE QN: <0.1 KUA/L
STREPTOCOCCUS PNEUMONIAE SEROTYPE 11A: 1.1 MCG/ML
STREPTOCOCCUS PNEUMONIAE SEROTYPE 11A: 1.2 MCG/ML
STREPTOCOCCUS PNEUMONIAE SEROTYPE 15B: <0.4 MCG/ML
STREPTOCOCCUS PNEUMONIAE SEROTYPE 15B: <0.4 MCG/ML
STREPTOCOCCUS PNEUMONIAE SEROTYPE 33F: <0.4 MCG/ML
STREPTOCOCCUS PNEUMONIAE SEROTYPE 33F: <0.4 MCG/ML

## 2021-08-30 ENCOUNTER — NON-APPOINTMENT (OUTPATIENT)
Age: 66
End: 2021-08-30

## 2021-08-30 ENCOUNTER — APPOINTMENT (OUTPATIENT)
Dept: PEDIATRIC ALLERGY IMMUNOLOGY | Facility: CLINIC | Age: 66
End: 2021-08-30
Payer: COMMERCIAL

## 2021-08-30 VITALS — BODY MASS INDEX: 21.05 KG/M2 | WEIGHT: 115.08 LBS

## 2021-08-30 DIAGNOSIS — R94.2 ABNORMAL RESULTS OF PULMONARY FUNCTION STUDIES: ICD-10-CM

## 2021-08-30 PROCEDURE — 36415 COLL VENOUS BLD VENIPUNCTURE: CPT

## 2021-08-30 PROCEDURE — 95079 INGEST CHALLENGE ADDL 60 MIN: CPT

## 2021-08-30 PROCEDURE — 94070 EVALUATION OF WHEEZING: CPT

## 2021-08-30 PROCEDURE — 95012 NITRIC OXIDE EXP GAS DETER: CPT

## 2021-08-30 PROCEDURE — 95076 INGEST CHALLENGE INI 120 MIN: CPT

## 2021-09-02 ENCOUNTER — APPOINTMENT (OUTPATIENT)
Dept: OTOLARYNGOLOGY | Facility: CLINIC | Age: 66
End: 2021-09-02

## 2021-09-02 ENCOUNTER — NON-APPOINTMENT (OUTPATIENT)
Age: 66
End: 2021-09-02

## 2021-09-02 PROBLEM — R94.2 ABNORMAL PULMONARY FUNCTION TEST: Status: ACTIVE | Noted: 2021-07-26

## 2021-09-02 NOTE — REVIEW OF SYSTEMS
[Nl] : Integumentary [Fatigue] : no fatigue [Eye Discharge] : no eye discharge [Eye Redness] : no redness [Puffy Eyelids] : no puffy ~T eyelids [Bloodshot Eyes] : no bloodshot ~T eyes [Cyanosis] : no cyanosis [Chest Pain] : no chest pain or discomfort [Exercise Intolerance] : no persistence of exercise intolerance [Nocturnal Awakening] : no nocturnal awakening with shortness of breath [Cough] : no cough [Wheezing Worsens With Exercise] : wheezing does not worsen with exercise [Wheezing] : no wheezing [FreeTextEntry4] : see HPI

## 2021-09-02 NOTE — HISTORY OF PRESENT ILLNESS
[Asthma] : asthma  [Asthma well controlled?] : asthma well controlled: yes [] : The following medications are to be available during the challenge procedure: [Diphenhydramine] : Diphenhydramine, 1-2mg/kg IM (max dose 50mg), (50mg/1 cc) [Epinephrine 1:1000 IM] : Epinephrine 1:1000 IM, 0.01cc/kg (max dose 0.5 cc) [Albuterol MDI] : Albuterol MDI, 2 - 4 puffs [Albuterol nebulized] : Albuterol nebulized, 0.083% [_______] : Time: [unfilled] [Clear] : Skin Findings: Clear [No] : Reaction: No [___] : RR: [unfilled]  [____] : IVB: [unfilled] [___] : Time: [unfilled] [___% 1) Skin -  A) Erythematous rash - % area involved] : Erythematous Rash (IA): [unfilled] % area involved [0 Pruritus: 0  - absent] : Pruritus (IB): 0 - absent [0 Urticaria/Angioedema: 0 - Absent] : Urticaria/Angioedema (IC): 0  - Absent [0 Rash: 0 - Absent] : Rash (ID): 0 - Absent [0 Sneezing/Itchin - Absent] : Sneezing/Itching (IIA): 0 - Absent [0 Nasal congestion: 0 - Absent] : Nasal congestion (IIB): 0 - Absent [0 Rhinorrhea: 0 - Absent] : Rhinorrhea (IIC): 0 - Absent [0 Laryngeal: 0 - Absent] : Laryngeal (IID): 0 - Absent [0 Wheezin - Absent] : Wheezing (IIIA): 0 - Absent [0 Gastro-Subjective complaints: 0 - Absent] : Gastro-Subjective Complaints (CRISSY): 0 - Absent [0 Gastro-Objective complaints: 0 - Absent] : Gastro-Objective Complaints (IVB): 0 - Absent [Antihistamine use in past 5 days] : No antihistamine use in past 5 days [Recent Illness] : no recent illness [Fever] : no fever [de-identified] : BRIANA RAZA is a 65 year old female with chronic rhinosinusitis with nasal polyposis ( s/p multiple sinus surgeries: 7-9), last sinus surgery with polypectomy was three years ago,   asthma, former smoker, who returns for Aspirin challenge. \par ENT- Dr Jose. \par \par Interval history: \par +nasal polyps, No sense of taste or smell.\par  Asthma well controlled with Advair. \par - She hasn't taking NSAIDs in years because she read that NSAIDs make nasal polyps worse. She never had reaction to NSAIDs.\par \par SNOT(22)-79\par ACT- 22 \par FENO- 53\par \par History of nasal polyposis:\par She has had 7 or 9 (not sure) sinus surgeries; most recent surgery was 4-5 years ago. Over the last year she did receive 1 courses of steroids for allergic reaction to flea bites.\par Prior to the surgeries, she had nasal congestion, runny nose, not being able breath, anosmia, and no sense of taste. Symptoms improve after each surgery, but only lasts about 4-5 years. She has a history of fungal sinus infection in the past, but no sinusitis within the past year. She has never taken aspirin before. She stopped taking Motrin 5-8 years ago because she researched that it could exacerbate nasal polyps. She tolerates tylenol., Maxol. After sips of champagne a few weeks ago, she becomes a little flushed and congestion, but not every time. SNOT(22)- 79\par History of asthma:\par She was diagnosed with asthma at about 37 - 42 years of age. she currently takes Advair 250 BID for asthma. Albuterol at home (uses twice a year). Denies taking steroids in the last year. \par \par \par \par ENVIRONMENTAL ALLERGY\par - patient tested positive for mold and cockroach with her previous allergist. She is currently on budesonide nasal rinse, prescribed from ENT, and her symptoms have been better. \par \par SNOT-22 = 49. \par COVID vaccination\par Pfizer - 03/17, 04/7. \par No history or symptoms of eczematous rashes, venom reactions, food allergies. \par  [FreeTextEntry1] : Aspirin challenge  [FreeTextEntry2] : 405mg  [FreeTextEntry9] : no reaction.  [de-identified] : no reaction  [de-identified] : no reaction  [de-identified] : No reaction

## 2021-09-02 NOTE — PHYSICAL EXAM
[Alert] : alert [Well Nourished] : well nourished [No Acute Distress] : no acute distress [Well Developed] : well developed [Sclera Not Icteric] : sclera not icteric [Normal TMs] : both tympanic membranes were normal [Normal Rate and Effort] : normal respiratory rhythm and effort [No Crackles] : no crackles [Bilateral Audible Breath Sounds] : bilateral audible breath sounds [Normal Rate] : heart rate was normal  [Normal S1, S2] : normal S1 and S2 [No murmur] : no murmur [Regular Rhythm] : with a regular rhythm [Skin Intact] : skin intact  [No Rash] : no rash [No Skin Lesions] : no skin lesions [Normal Mood] : mood was normal [Normal Affect] : affect was normal [Judgment and Insight Age Appropriate] : judgement and insight is age appropriate [Alert, Awake, Oriented as Age-Appropriate] : alert, awake, oriented as age appropriate [Conjunctival Erythema] : no conjunctival erythema [Boggy Nasal Turbinates] : no boggy and/or pale nasal turbinates [Pharyngeal erythema] : no pharyngeal erythema [Posterior Pharyngeal Cobblestoning] : no posterior pharyngeal cobblestoning [Clear Rhinorrhea] : no clear rhinorrhea was seen [Exudate] : no exudate [Wheezing] : no wheezing was heard [Patches] : no patches

## 2021-09-02 NOTE — DATA REVIEWED
[FreeTextEntry1] : FENO-53\par \par Obstructive airflow pattern without any changes with ASA challenge.

## 2021-09-08 ENCOUNTER — APPOINTMENT (OUTPATIENT)
Dept: PEDIATRIC PULMONARY CYSTIC FIB | Facility: CLINIC | Age: 66
End: 2021-09-08

## 2021-09-09 ENCOUNTER — NON-APPOINTMENT (OUTPATIENT)
Age: 66
End: 2021-09-09

## 2021-09-09 LAB
BASOPHILS # BLD AUTO: 0.07 K/UL
BASOPHILS NFR BLD AUTO: 1 %
EOSINOPHIL # BLD AUTO: 0.38 K/UL
EOSINOPHIL NFR BLD AUTO: 5.6 %
HCT VFR BLD CALC: 44.6 %
HGB BLD-MCNC: 13.8 G/DL
IMM GRANULOCYTES NFR BLD AUTO: 0.1 %
LEUKOTRIENE E4, URINE: 155 CD:455662145
LYMPHOCYTES # BLD AUTO: 3.07 K/UL
LYMPHOCYTES NFR BLD AUTO: 45.3 %
MAN DIFF?: NORMAL
MANNAN BINDING LECTIN (MBL): 354 NG/ML
MCHC RBC-ENTMCNC: 30.6 PG
MCHC RBC-ENTMCNC: 30.9 GM/DL
MCV RBC AUTO: 98.9 FL
MONOCYTES # BLD AUTO: 0.36 K/UL
MONOCYTES NFR BLD AUTO: 5.3 %
NEUTROPHILS # BLD AUTO: 2.88 K/UL
NEUTROPHILS NFR BLD AUTO: 42.7 %
PLATELET # BLD AUTO: 240 K/UL
RBC # BLD: 4.51 M/UL
RBC # FLD: 12.4 %
TOTAL IGE SMQN RAST: 44 KU/L
TRYPTASE: 6.7 UG/L
WBC # FLD AUTO: 6.77 K/UL

## 2021-10-21 ENCOUNTER — APPOINTMENT (OUTPATIENT)
Dept: PEDIATRIC ALLERGY IMMUNOLOGY | Facility: CLINIC | Age: 66
End: 2021-10-21
Payer: COMMERCIAL

## 2021-10-21 DIAGNOSIS — R79.89 OTHER SPECIFIED ABNORMAL FINDINGS OF BLOOD CHEMISTRY: ICD-10-CM

## 2021-10-21 DIAGNOSIS — R89.8 OTHER ABNORMAL FINDINGS IN SPECIMENS FROM OTHER ORGANS, SYSTEMS AND TISSUES: ICD-10-CM

## 2021-10-21 PROCEDURE — 99443: CPT

## 2021-10-21 NOTE — REVIEW OF SYSTEMS
[Rhinorrhea] : rhinorrhea [Nasal Congestion] : nasal congestion [Post Nasal Drip] : post nasal drip [Sneezing] : sneezing [Difficulty Breathing] : no dyspnea [SOB at Rest] : no shortness of breath at rest [Cough] : no cough [Wheezing] : no wheezing [Abdominal Pain] : abdominal pain [Headache] : no headache [Urticaria] : no urticaria [Swelling] : no swelling [Easy Bruising] : no tendency for easy bruising [Nosebleeds] : no epistaxis [Recurrent Sinus Infections] : recurrent sinus infections [Nl] : Musculoskeletal [FreeTextEntry4] : Anosmia, loss of sense of taste [FreeTextEntry7] : bloating, controlled with vwery resticted diet

## 2021-10-21 NOTE — CONSULT LETTER
[Dear  ___] : Dear  [unfilled], [Courtesy Letter:] : I had the pleasure of seeing your patient, [unfilled], in my office today. [Please see my note below.] : Please see my note below. [Consult Closing:] : Thank you very much for allowing me to participate in the care of this patient.  If you have any questions, please do not hesitate to contact me. [Sincerely,] : Sincerely, [FreeTextEntry3] : Dr. Jose

## 2021-10-21 NOTE — HISTORY OF PRESENT ILLNESS
[Home] : at home, [unfilled] , at the time of the visit. [Other Location: e.g. Home (Enter Location, City,State)___] : at [unfilled] [Verbal consent obtained from patient] : the patient, [unfilled] [Eczematous rashes] : eczematous rashes [Venom Reactions] : venom reactions [Food Allergies] : food allergies [de-identified] : BRIANA RAZA is a 66 year old female with chronic rhinosinusitis with nasal polyposis ( s/p multiple sinus surgeries: 7-9), poorly controlled asthma,  GI issues, former smoker, who returns for follow up.\par ENT- Dr Jose. 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 Over the last year she did receive 1 courses of steroids for allergic reaction to flea bites.\par = Allergic Rhinitis: skin testing positive to molds, IgE- negative\par = asthma: She was diagnosed with asthma at about 37 - 42 years of age.  she currently takes Advair 250 BID for asthma. Albuterol at home (uses twice a year). Denies taking steroids in the last year. \par \par FOOD AVOIDANCE\par - Dairy - stomach upset, bloated, started around the same time as asthma, no diarrhea. no SOB\par - Gluten free diet, doesn't like to eat meat, avoids soy and peanut as personal choice, not due to adverse reactions \par - tolerates wheat, eggs, tree nuts.\par - Shellfish(lobster, crab) - tongue swelling, but eats shrimp, \par \par ENVIRONMENTAL ALLERGY\par - patient tested positive for mold and cockroach with her previous allergist. She is currently on budesonide nasal rinse, prescirbed from ENT, and her symptoms have been better. \par \par SNOT-22 = 49. \par COVID vaccination\par Pfizer - 03/17, 04/7

## 2021-10-26 ENCOUNTER — APPOINTMENT (OUTPATIENT)
Dept: PEDIATRIC ALLERGY IMMUNOLOGY | Facility: CLINIC | Age: 66
End: 2021-10-26
Payer: COMMERCIAL

## 2021-10-26 ENCOUNTER — LABORATORY RESULT (OUTPATIENT)
Age: 66
End: 2021-10-26

## 2021-10-26 ENCOUNTER — APPOINTMENT (OUTPATIENT)
Dept: PULMONOLOGY | Facility: CLINIC | Age: 66
End: 2021-10-26
Payer: COMMERCIAL

## 2021-10-26 VITALS
SYSTOLIC BLOOD PRESSURE: 116 MMHG | DIASTOLIC BLOOD PRESSURE: 78 MMHG | HEIGHT: 62 IN | WEIGHT: 113 LBS | OXYGEN SATURATION: 96 % | RESPIRATION RATE: 16 BRPM | TEMPERATURE: 97.5 F | HEART RATE: 59 BPM | BODY MASS INDEX: 20.8 KG/M2

## 2021-10-26 DIAGNOSIS — J45.40 MODERATE PERSISTENT ASTHMA, UNCOMPLICATED: ICD-10-CM

## 2021-10-26 PROCEDURE — 99204 OFFICE O/P NEW MOD 45 MIN: CPT

## 2021-10-26 PROCEDURE — 90732 PPSV23 VACC 2 YRS+ SUBQ/IM: CPT

## 2021-10-26 PROCEDURE — G0009: CPT

## 2021-10-26 RX ORDER — ALBUTEROL SULFATE 90 UG/1
108 (90 BASE) AEROSOL, METERED RESPIRATORY (INHALATION)
Qty: 8 | Refills: 0 | Status: DISCONTINUED | COMMUNITY
Start: 2017-05-04 | End: 2021-10-26

## 2021-10-26 RX ORDER — EMOLLIENT COMBINATION NO.60
SPRAY GEL TOPICAL
Qty: 170 | Refills: 0 | Status: DISCONTINUED | COMMUNITY
Start: 2017-11-09 | End: 2021-10-26

## 2021-10-26 NOTE — HISTORY OF PRESENT ILLNESS
[Former] : former [< 30 pack-years] : < 30 pack-years [TextBox_4] : 66 y/oF former smoker here for rule out CF with Pmhx significant for chronic rhinosinusitis with nasal polyposis s/p 8 sinus surgeries (last sx ~ 5 years ago), asthma and chronic GI issues. She was referred by Dr. Villatoro after genetic testing revealed  + CFTR c.1210-34TG[11] T [5]\par \par Birth history: No difficulty gaining weight or meeting milestones. \par \par PULM: Hx of Asthma: started in her 30's. Uses Wixela and Duonebs PRN. Exacerbates 2-3x/year requiring prednisone. \par Has had "walking PNA" 3-4 times in her life, treated with oral ABX. Reports ED visit during one of these episodes for difficulty breathing. Unsure if she was admitted. Denies ICU stay/intubation. \par Endorses cough at baseline, no mucus production. SOB only with activity. Denies chest tightness, wheeze, difficulty breathing. \par \par ID: Reports hx of fungal infections in sinuses. Never on IVABX. \par \par ENT: Denies frequent strep throat or ear infections. \par +Sinus disease. polyps started in her 30's, unable to blow nose, +pain/pressure/congestion. Has had multiple sinus surgeries, last one ! 5 yers ago. Sees Dr. Jose. Uses Budesonide rinses BID. Denies frequent sinus infections. \par \par GI: + hx of constipation. Denies diarrhea\par GI issues started in her 20's. +bloating and food intolerances after meals. Denies steatorrhea. Normal BM's 1x/day, soft brown. \par Now with restricted diet that includes salads, salmon (occasionally), goat milk, veg, nuts. Tries to adhere to mostly vegan diet. \par B: coffee with goat milk and MVM shake- goat milk, alicia seeds, and cashews. \par L: 2 eggs lettuce on almond wrap.\par D: salad with peppers, beans, nuts.\par No hx of  pancreatitis or cholecystitis. \par Colonoscopy done within the last 2 years- benign polyps. \par \par Nutrition: No hx of vitamin deficiencies. + Weight loss when she had poor eating habits to avoid bloating. \par \par Heme: Hx of anemia when she had poor eating habits. \par \par ENDO: Denies hx of bone disease, diabetes, polyuria, polydipsia, polyphagia, or hx of thyroid disease.\par \par PSYCH: + hx of anxiety and depression.\par \par Social: Denies work exposures. Worked in [employer] for 13 years- 2nd hand smoke exposure. Champagne occasionally. No illicit drug use. \par  with 2 children, one son, one daughter, 5 grandchildren. \par \par Famhx: No hx of CF in family or lung disease. Mother - hx of Guillain barre after receiving flu shot. Father no known history. \par Has 4 brothers. 1 brother had hx of pancreatic CA, . 2nd brother- hx of prostate CA, 3rd brother- hx of asthma, 4th brother healthy. States that some of her brothers have hx of similar sinus issues. \par \par OB: No difficulty conceiving. No hx of miscarriage. S/p hysterectomy. Son healthy, Daughter hx of glioma in 20's.  [TextBox_11] : 1 [TextBox_13] : 14 [YearQuit] : >10 yeats ago

## 2021-10-26 NOTE — END OF VISIT
[FreeTextEntry3] : pt with recurrent sinus disease, not been on nasal antibiotics. total of 9 sinus surgeries, last one 5 years ago. Pt is unsure if specific pathogens or fungus were cultured but reports being mostly on antibiotics. asthma diagnosed as an adult and is well managed on wixela. 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 Needs sweat test, last time on CS taper was one month ago. repeat ABPA, ANCA, CBC diff, IgE today. PCD was negative.\par \par I, Dr. Brianna Greene, personally performed the evaluation and management (E/M) services for this established patient who presents today with (a) new problem(s)/exacerbation of (an) existing condition(s).  That E/M includes conducting the examination, assessing all new/exacerbated conditions, and establishing a new plan of care.  Today, Shalini Ludwig ACP, was here to observe my evaluation and management services for this new problem/exacerbated condition to be followed going forward.\par 45 minutes time spent for patient education related to comorbidities and medications, medical records/labs/radiology reviews, preventative care, documentation.\par

## 2021-10-26 NOTE — REVIEW OF SYSTEMS
[Fatigue] : fatigue [Nasal Congestion] : nasal congestion [Postnasal Drip] : postnasal drip [Cough] : cough [SOB on Exertion] : sob on exertion [Seasonal Allergies] : seasonal allergies [Food Intolerance] : food intolerance [Depression] : depression [Anxiety] : anxiety [Panic Attacks] : panic attacks [Negative] : Endocrine [Sore Throat] : no sore throat [Chest Tightness] : no chest tightness [Sputum] : no sputum [Dyspnea] : no dyspnea [Wheezing] : no wheezing [GERD] : no gerd [Abdominal Pain] : no abdominal pain [Diarrhea] : no diarrhea [Constipation] : no constipation [TextBox_69] : see HPI  [TextBox_122] : migraines

## 2021-10-26 NOTE — ASSESSMENT
[FreeTextEntry1] : 66 y/oF former smoker here for rule out CF with Pmhx significant for chronic rhinosinusitis with nasal polyposis s/p 8 sinus surgeries (last sx ~ 5 years ago), asthma and chronic GI issues. She was referred by Dr. 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 \par \par r/o EGPA/GPA\par - ANCA w/ reflex, CBC w/ diff sent today. \par \par r/o CF\par - Sweat chloride test ordered, provided patient with scheduling information. \par - IgE and Aspergillus AB's sent today. \par - CXR ordered, script provided. \par - Consented to registry.

## 2021-10-27 LAB
BASOPHILS # BLD AUTO: 0.07 K/UL
BASOPHILS NFR BLD AUTO: 0.8 %
EOSINOPHIL # BLD AUTO: 0.42 K/UL
EOSINOPHIL NFR BLD AUTO: 4.7 %
HCT VFR BLD CALC: 45.4 %
HGB BLD-MCNC: 14.5 G/DL
IMM GRANULOCYTES NFR BLD AUTO: 0.2 %
LYMPHOCYTES # BLD AUTO: 3.13 K/UL
LYMPHOCYTES NFR BLD AUTO: 34.7 %
MAN DIFF?: NORMAL
MCHC RBC-ENTMCNC: 30.7 PG
MCHC RBC-ENTMCNC: 31.9 GM/DL
MCV RBC AUTO: 96 FL
MONOCYTES # BLD AUTO: 0.57 K/UL
MONOCYTES NFR BLD AUTO: 6.3 %
NEUTROPHILS # BLD AUTO: 4.8 K/UL
NEUTROPHILS NFR BLD AUTO: 53.3 %
PLATELET # BLD AUTO: 242 K/UL
RBC # BLD: 4.73 M/UL
RBC # FLD: 12 %
WBC # FLD AUTO: 9.01 K/UL

## 2021-11-05 LAB
A FLAVUS AB FLD QL: NEGATIVE
A FUMIGATUS AB FLD QL: NEGATIVE
A NIGER AB FLD QL: NEGATIVE
MPO AB + PR3 PNL SER: NORMAL
TOTAL IGE SMQN RAST: 44 KU/L

## 2021-11-15 ENCOUNTER — APPOINTMENT (OUTPATIENT)
Dept: PEDIATRIC PULMONARY CYSTIC FIB | Facility: CLINIC | Age: 66
End: 2021-11-15

## 2021-11-17 ENCOUNTER — NON-APPOINTMENT (OUTPATIENT)
Age: 66
End: 2021-11-17

## 2021-11-19 ENCOUNTER — NON-APPOINTMENT (OUTPATIENT)
Age: 66
End: 2021-11-19

## 2021-11-22 ENCOUNTER — NON-APPOINTMENT (OUTPATIENT)
Age: 66
End: 2021-11-22

## 2022-01-19 ENCOUNTER — APPOINTMENT (OUTPATIENT)
Dept: OTOLARYNGOLOGY | Facility: CLINIC | Age: 67
End: 2022-01-19

## 2022-03-10 ENCOUNTER — APPOINTMENT (OUTPATIENT)
Dept: OTOLARYNGOLOGY | Facility: CLINIC | Age: 67
End: 2022-03-10
Payer: COMMERCIAL

## 2022-03-10 VITALS
HEART RATE: 65 BPM | HEIGHT: 62 IN | DIASTOLIC BLOOD PRESSURE: 66 MMHG | WEIGHT: 115 LBS | BODY MASS INDEX: 21.16 KG/M2 | SYSTOLIC BLOOD PRESSURE: 128 MMHG

## 2022-03-10 DIAGNOSIS — Z14.1 CYSTIC FIBROSIS CARRIER: ICD-10-CM

## 2022-03-10 PROCEDURE — 99213 OFFICE O/P EST LOW 20 MIN: CPT

## 2022-03-10 NOTE — HISTORY OF PRESENT ILLNESS
[de-identified] : 66 year old female with hx of severe recurrent CRSwNP presents for follow-up\par LCV 7/1 at which time she was referred to Dr. Villatoro who she has now seen multiple times\par Negative ASA test\par Saw Dr. 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 Status post bilateral uncinectomies and superior and middle turbinectomies.

## 2022-03-11 ENCOUNTER — NON-APPOINTMENT (OUTPATIENT)
Age: 67
End: 2022-03-11

## 2022-03-18 LAB — EAR NOSE AND THROAT CULTURE: ABNORMAL

## 2022-04-01 ENCOUNTER — NON-APPOINTMENT (OUTPATIENT)
Age: 67
End: 2022-04-01

## 2022-04-07 ENCOUNTER — NON-APPOINTMENT (OUTPATIENT)
Age: 67
End: 2022-04-07

## 2022-04-13 ENCOUNTER — APPOINTMENT (OUTPATIENT)
Dept: PEDIATRIC ALLERGY IMMUNOLOGY | Facility: CLINIC | Age: 67
End: 2022-04-13
Payer: COMMERCIAL

## 2022-04-13 VITALS
HEART RATE: 63 BPM | TEMPERATURE: 97.16 F | WEIGHT: 115.99 LBS | BODY MASS INDEX: 21.34 KG/M2 | HEIGHT: 62 IN | OXYGEN SATURATION: 97 % | SYSTOLIC BLOOD PRESSURE: 124 MMHG | DIASTOLIC BLOOD PRESSURE: 64 MMHG

## 2022-04-13 DIAGNOSIS — J30.89 OTHER ALLERGIC RHINITIS: ICD-10-CM

## 2022-04-13 PROCEDURE — 99213 OFFICE O/P EST LOW 20 MIN: CPT | Mod: 25

## 2022-04-13 PROCEDURE — 95004 PERQ TESTS W/ALRGNC XTRCS: CPT

## 2022-04-13 RX ORDER — SULFAMETHOXAZOLE AND TRIMETHOPRIM 800; 160 MG/1; MG/1
800-160 TABLET ORAL TWICE DAILY
Qty: 14 | Refills: 0 | Status: COMPLETED | COMMUNITY
Start: 2022-03-16 | End: 2022-04-13

## 2022-04-13 RX ORDER — BUDESONIDE, MICRONIZED 100 %
POWDER (GRAM) MISCELLANEOUS
Qty: 120 | Refills: 3 | Status: COMPLETED | COMMUNITY
Start: 2021-07-01 | End: 2022-04-13

## 2022-04-13 NOTE — REVIEW OF SYSTEMS
[Rhinorrhea] : rhinorrhea [Nasal Congestion] : nasal congestion [Nl] : Integumentary [Fatigue] : no fatigue [Fever] : no fever [Eye Redness] : no redness [Puffy Eyelids] : no puffy ~T eyelids [Redness Of Eyelid] : no redness of ~T eyelid [Nosebleeds] : no epistaxis [Throat Itching] : no throat itching [Post Nasal Drip] : no post nasal drip [Sneezing] : no sneezing [Cyanosis] : no cyanosis [Edema] : no edema [Exercise Intolerance] : no persistence of exercise intolerance [Palpitations] : no palpitations [FreeTextEntry6] : see HPI

## 2022-04-13 NOTE — PHYSICAL EXAM
[Alert] : alert [Well Nourished] : well nourished [No Acute Distress] : no acute distress [Well Developed] : well developed [Sclera Not Icteric] : sclera not icteric [Normal TMs] : both tympanic membranes were normal [Normal Tonsils] : normal tonsils [Normal Rate and Effort] : normal respiratory rhythm and effort [No Crackles] : no crackles [Bilateral Audible Breath Sounds] : bilateral audible breath sounds [Normal Rate] : heart rate was normal  [Normal S1, S2] : normal S1 and S2 [No murmur] : no murmur [Regular Rhythm] : with a regular rhythm [Skin Intact] : skin intact  [No Rash] : no rash [No Skin Lesions] : no skin lesions [Normal Mood] : mood was normal [Conjunctival Erythema] : no conjunctival erythema [Boggy Nasal Turbinates] : no boggy and/or pale nasal turbinates [Pharyngeal erythema] : no pharyngeal erythema [Posterior Pharyngeal Cobblestoning] : no posterior pharyngeal cobblestoning [Clear Rhinorrhea] : no clear rhinorrhea was seen [Exudate] : no exudate [Wheezing] : no wheezing was heard [Patches] : no patches

## 2022-04-13 NOTE — IMPRESSION
[] : molds [Allergy Testing Dust Mite] : dust mites [Allergy Testing Mixed Feathers] : feathers [Allergy Testing Cockroach] : cockroach [Allergy Testing Dog] : dog [Allergy Testing Cat] : cat [Allergy Testing Trees] : trees [Allergy Testing Weeds] : weeds [Allergy Testing Grasses] : grasses

## 2022-04-13 NOTE — HISTORY OF PRESENT ILLNESS
[de-identified] : BRIANA RAZA is a 66 year old female with chronic rhinosinusitis with nasal polyposis ( s/p multiple sinus surgeries: 7-9), poorly controlled asthma, GI issues, former smoker, who returns for environmental testing. The patient requested re testing to environmental allergens prior to starting Dupilumab. \par ENT- Dr Jose. \par \par Interval history:\par  Anosmia and decrease sense of taste. \par Denies use of  nasal spray. Symptoms worsen with budesonide rinse, thus d/c. \par SNOT(22)- 63 (4/13/22)\par \par Asthma:\par on Wixela 250 one puff BD. \par -No wheezing or chest tightness. + cough with exertion. \par ACT-20 \par \par AR: Rhinorrhea on Zyrtec with improvement of PND. 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 Over the last year she did receive 1 courses of steroids for allergic reaction to flea bites.\par = Allergic Rhinitis: skin testing positive to molds, IgE- negative- old allergist\par \par = asthma: She was diagnosed with asthma at about 37 - 42 years of age. she currently takes Advair 250 BID for asthma. Albuterol at home (uses twice a year). Denies taking steroids in the last year. \par \par FOOD AVOIDANCE\par - Dairy - stomach upset, bloated, started around the same time as asthma, no diarrhea. no SOB\par - Gluten free diet, doesn't like to eat meat, avoids soy and peanut as personal choice, not due to adverse reactions \par - tolerates wheat, eggs, tree nuts.\par - Shellfish(lobster, crab) - tongue swelling, but eats shrimp, \par \par ENVIRONMENTAL ALLERGY\par - patient tested positive for mold and cockroach with her previous allergist. She is currently on budesonide nasal rinse, prescirbed from ENT, and her symptoms have been better. \par \par SNOT-22 = 49. \par COVID vaccination\par Pfizer - 03/17, 04/7. \par No history or symptoms of eczematous rashes, venom reactions, food allergies. \par

## 2022-05-09 ENCOUNTER — NON-APPOINTMENT (OUTPATIENT)
Age: 67
End: 2022-05-09

## 2022-05-20 ENCOUNTER — APPOINTMENT (OUTPATIENT)
Dept: RADIOLOGY | Facility: CLINIC | Age: 67
End: 2022-05-20
Payer: COMMERCIAL

## 2022-05-20 ENCOUNTER — OUTPATIENT (OUTPATIENT)
Dept: OUTPATIENT SERVICES | Facility: HOSPITAL | Age: 67
LOS: 1 days | End: 2022-05-20
Payer: COMMERCIAL

## 2022-05-20 DIAGNOSIS — Z98.890 OTHER SPECIFIED POSTPROCEDURAL STATES: Chronic | ICD-10-CM

## 2022-05-20 DIAGNOSIS — Z90.710 ACQUIRED ABSENCE OF BOTH CERVIX AND UTERUS: Chronic | ICD-10-CM

## 2022-05-20 DIAGNOSIS — Z00.00 ENCOUNTER FOR GENERAL ADULT MEDICAL EXAMINATION WITHOUT ABNORMAL FINDINGS: ICD-10-CM

## 2022-05-20 PROCEDURE — 77080 DXA BONE DENSITY AXIAL: CPT | Mod: 26

## 2022-05-20 PROCEDURE — 77080 DXA BONE DENSITY AXIAL: CPT

## 2022-06-09 ENCOUNTER — APPOINTMENT (OUTPATIENT)
Dept: PEDIATRIC ALLERGY IMMUNOLOGY | Facility: CLINIC | Age: 67
End: 2022-06-09

## 2022-06-30 ENCOUNTER — APPOINTMENT (OUTPATIENT)
Dept: PEDIATRIC ALLERGY IMMUNOLOGY | Facility: CLINIC | Age: 67
End: 2022-06-30

## 2022-06-30 VITALS
BODY MASS INDEX: 21.71 KG/M2 | DIASTOLIC BLOOD PRESSURE: 73 MMHG | HEART RATE: 61 BPM | WEIGHT: 117.99 LBS | OXYGEN SATURATION: 96 % | TEMPERATURE: 96.98 F | HEIGHT: 62 IN | SYSTOLIC BLOOD PRESSURE: 118 MMHG

## 2022-06-30 DIAGNOSIS — L50.8 OTHER URTICARIA: ICD-10-CM

## 2022-06-30 PROCEDURE — XXXXX: CPT

## 2022-06-30 RX ORDER — DOXYCYCLINE 100 MG/1
100 CAPSULE ORAL
Qty: 14 | Refills: 0 | Status: COMPLETED | COMMUNITY
Start: 2022-05-10 | End: 2022-06-30

## 2022-07-01 ENCOUNTER — NON-APPOINTMENT (OUTPATIENT)
Age: 67
End: 2022-07-01

## 2022-07-01 LAB
25(OH)D3 SERPL-MCNC: 34.7 NG/ML
ALBUMIN SERPL ELPH-MCNC: 4.5 G/DL
ALP BLD-CCNC: 70 U/L
ALT SERPL-CCNC: 12 U/L
ANION GAP SERPL CALC-SCNC: 12 MMOL/L
AST SERPL-CCNC: 13 U/L
BASOPHILS # BLD AUTO: 0.05 K/UL
BASOPHILS NFR BLD AUTO: 0.5 %
BILIRUB SERPL-MCNC: 0.3 MG/DL
BUN SERPL-MCNC: 13 MG/DL
C4 SERPL-MCNC: 27 MG/DL
CALCIUM SERPL-MCNC: 9.8 MG/DL
CHLORIDE SERPL-SCNC: 102 MMOL/L
CO2 SERPL-SCNC: 25 MMOL/L
CREAT SERPL-MCNC: 0.71 MG/DL
EGFR: 95 ML/MIN/1.73M2
EOSINOPHIL # BLD AUTO: 0.73 K/UL
EOSINOPHIL NFR BLD AUTO: 7.6 %
GLUCOSE SERPL-MCNC: 93 MG/DL
HCT VFR BLD CALC: 44 %
HGB BLD-MCNC: 13.6 G/DL
IMM GRANULOCYTES NFR BLD AUTO: 0.2 %
LYMPHOCYTES # BLD AUTO: 2.8 K/UL
LYMPHOCYTES NFR BLD AUTO: 29.1 %
MAN DIFF?: NORMAL
MCHC RBC-ENTMCNC: 28.9 PG
MCHC RBC-ENTMCNC: 30.9 GM/DL
MCV RBC AUTO: 93.6 FL
MONOCYTES # BLD AUTO: 0.54 K/UL
MONOCYTES NFR BLD AUTO: 5.6 %
NEUTROPHILS # BLD AUTO: 5.47 K/UL
NEUTROPHILS NFR BLD AUTO: 57 %
PLATELET # BLD AUTO: 294 K/UL
POTASSIUM SERPL-SCNC: 4.4 MMOL/L
PROT SERPL-MCNC: 6.7 G/DL
RBC # BLD: 4.7 M/UL
RBC # FLD: 12.6 %
SODIUM SERPL-SCNC: 139 MMOL/L
THYROGLOB AB SERPL-ACNC: <20 IU/ML
THYROPEROXIDASE AB SERPL IA-ACNC: 52.3 IU/ML
TSH SERPL-ACNC: 2.3 UIU/ML
WBC # FLD AUTO: 9.61 K/UL

## 2022-07-13 LAB — CHRONIC URTICARIA PANEL (CU INDEX): <1.1

## 2022-07-20 NOTE — HISTORY OF PRESENT ILLNESS
[de-identified] : BRIANA RAZA is a 66 year old female with chronic rhinosinusitis with nasal polyposis ( s/p multiple sinus surgeries: 7-9), poorly controlled asthma, GI issues, former smoker, who returns for follow up. The patient was approved for Dupilumab, however she wants to hold on with starting Dupilumab. Last month she got ACTT -alternative current therapy -( high frequency device which disrupts proliferation cell and cell division). She will follow up six month after ACTT then follow up with ENT. If polyps are regrowing then she will start Dupilumab. \par Since ACTT- her nasal congestion has improved.  \par ENT- Dr Jose. \par \par Since September two facial swelling. In September facial swelling led to ER visit at [facility], she was treated with IV steroid and Benadryl with improvement. June 26- Sunday she redeveloped pruritic  facial rash with swelling with erythema which moved to side of right side of the face and chest. She has not tired any antihistamines.  \par Rash- erythematous raised rash which moves within 24 hours and  improves with Benadryl. \par using new oil on face no other new. No associated chills or fever. \par \par Interval history:\par  Anosmia and decrease sense of taste. \par Denies use of nasal spray. Symptoms worsen with budesonide rinse, thus d/c. \par SNOT(22)- 38 6/30/22\par SNOT(22)- 63 (4/13/22)\par \par Asthma:\par on Wixela 250 one puff BD. \par -No wheezing or chest tightness. Cough improved with self resolution of PND. \par ACT-24; 6/30/22\par ACT-20 \par \par \par AR: Rhinorrhea on Zyrtec with improvement of PND. 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 Over the last year she did receive 1 courses of steroids for allergic reaction to flea bites.\par = Allergic Rhinitis: skin testing positive to molds, IgE- negative- old allergist\par \par = asthma: She was diagnosed with asthma at about 37 - 42 years of age. she currently takes Advair 250 BID for asthma. Albuterol at home (uses twice a year). Denies taking steroids in the last year. \par \par FOOD AVOIDANCE\par - Dairy - stomach upset, bloated, started around the same time as asthma, no diarrhea. no SOB\par - Gluten free diet, doesn't like to eat meat, avoids soy and peanut as personal choice, not due to adverse reactions \par - tolerates wheat, eggs, tree nuts.\par - Shellfish(lobster, crab) - tongue swelling, but eats shrimp, \par \par ENVIRONMENTAL ALLERGY\par - patient tested positive for mold and cockroach with her previous allergist. She is currently on budesonide nasal rinse, prescirbed from ENT, and her symptoms have been better. \par \par SNOT-22 = 49. \par COVID vaccination\par Pfizer - 03/17, 04/7. \par No history or symptoms of eczematous rashes, venom reactions, food allergies. \par

## 2022-07-20 NOTE — PHYSICAL EXAM
[Alert] : alert [Well Nourished] : well nourished [No Acute Distress] : no acute distress [Well Developed] : well developed [Sclera Not Icteric] : sclera not icteric [Normal TMs] : both tympanic membranes were normal [Normal Tonsils] : normal tonsils [Normal Rate and Effort] : normal respiratory rhythm and effort [No Crackles] : no crackles [Bilateral Audible Breath Sounds] : bilateral audible breath sounds [Normal Rate] : heart rate was normal  [Normal S1, S2] : normal S1 and S2 [No murmur] : no murmur [Regular Rhythm] : with a regular rhythm [Normal Mood] : mood was normal [Normal Affect] : affect was normal [Judgment and Insight Age Appropriate] : judgement and insight is age appropriate [Alert, Awake, Oriented as Age-Appropriate] : alert, awake, oriented as age appropriate [Conjunctival Erythema] : no conjunctival erythema [Posterior Pharyngeal Cobblestoning] : no posterior pharyngeal cobblestoning [Clear Rhinorrhea] : no clear rhinorrhea was seen [Exudate] : no exudate [Wheezing] : no wheezing was heard [de-identified] : +erythematous rased rash on chest and back. Facial- erythematous.

## 2022-07-20 NOTE — REVIEW OF SYSTEMS
[Nl] : Cardiovascular [Fatigue] : no fatigue [Fever] : no fever [Eye Redness] : no redness [Puffy Eyelids] : no puffy ~T eyelids [Redness Of Eyelid] : no redness of ~T eyelid [Cough] : no cough [Wheezing Worsens With Exercise] : wheezing does not worsen with exercise [Wheezing] : no wheezing [FreeTextEntry4] : see HPI [de-identified] : see HPI

## 2022-08-15 ENCOUNTER — OUTPATIENT (OUTPATIENT)
Dept: OUTPATIENT SERVICES | Facility: HOSPITAL | Age: 67
LOS: 1 days | End: 2022-08-15
Payer: MEDICARE

## 2022-08-15 ENCOUNTER — APPOINTMENT (OUTPATIENT)
Dept: ULTRASOUND IMAGING | Facility: CLINIC | Age: 67
End: 2022-08-15

## 2022-08-15 ENCOUNTER — APPOINTMENT (OUTPATIENT)
Dept: MAMMOGRAPHY | Facility: CLINIC | Age: 67
End: 2022-08-15

## 2022-08-15 DIAGNOSIS — Z98.890 OTHER SPECIFIED POSTPROCEDURAL STATES: Chronic | ICD-10-CM

## 2022-08-15 DIAGNOSIS — Z90.710 ACQUIRED ABSENCE OF BOTH CERVIX AND UTERUS: Chronic | ICD-10-CM

## 2022-08-15 DIAGNOSIS — Z00.8 ENCOUNTER FOR OTHER GENERAL EXAMINATION: ICD-10-CM

## 2022-08-15 LAB
IGE RECEPTOR AB INTERPRETATION: NORMAL
IGE RECEPTOR AB: NORMAL %

## 2022-08-15 PROCEDURE — 76641 ULTRASOUND BREAST COMPLETE: CPT

## 2022-08-15 PROCEDURE — 77063 BREAST TOMOSYNTHESIS BI: CPT | Mod: 26

## 2022-08-15 PROCEDURE — 77067 SCR MAMMO BI INCL CAD: CPT | Mod: 26

## 2022-08-15 PROCEDURE — 77067 SCR MAMMO BI INCL CAD: CPT

## 2022-08-15 PROCEDURE — 76641 ULTRASOUND BREAST COMPLETE: CPT | Mod: 26,50

## 2022-08-15 PROCEDURE — 77063 BREAST TOMOSYNTHESIS BI: CPT

## 2022-09-22 ENCOUNTER — APPOINTMENT (OUTPATIENT)
Dept: OTOLARYNGOLOGY | Facility: CLINIC | Age: 67
End: 2022-09-22

## 2022-09-22 VITALS
SYSTOLIC BLOOD PRESSURE: 129 MMHG | HEIGHT: 62 IN | HEART RATE: 60 BPM | BODY MASS INDEX: 20.98 KG/M2 | WEIGHT: 114 LBS | DIASTOLIC BLOOD PRESSURE: 73 MMHG

## 2022-09-22 DIAGNOSIS — J33.9 NASAL POLYP, UNSPECIFIED: ICD-10-CM

## 2022-09-22 PROCEDURE — 92567 TYMPANOMETRY: CPT

## 2022-09-22 PROCEDURE — 92557 COMPREHENSIVE HEARING TEST: CPT

## 2022-09-22 PROCEDURE — 99214 OFFICE O/P EST MOD 30 MIN: CPT | Mod: 25

## 2022-09-22 PROCEDURE — 31231 NASAL ENDOSCOPY DX: CPT

## 2022-09-22 RX ORDER — DUPILUMAB 300 MG/2ML
300 INJECTION, SOLUTION SUBCUTANEOUS
Qty: 1 | Refills: 10 | Status: COMPLETED | COMMUNITY
Start: 2022-04-01 | End: 2022-09-22

## 2022-09-22 NOTE — DATA REVIEWED
[de-identified] : 9/22/22-- AD mild to mod CHL to profound mixed through 8KHz, AS normal to severe SNHL, tymps AD type B, AS type A

## 2022-09-22 NOTE — HISTORY OF PRESENT ILLNESS
[de-identified] : 67 year old female with hx of severe recurrent CRSwNP presents for follow-up\par LCV 03/10/22 at time was recommended Dupxient\par She has followed with Dr. Villatoro of  several times since then\par Opted to trial ACTT device instead of Dupixent and she feels this has contributed some meaningful improvement, including with nasal congestion\par Reports right ear clogged for at least 2-3 weeks with decreased muffled hearing\par Constant tinnitus- not using ear drops at this time.\par Reports constant cough for about one week with nasal congestion- using Fluticasone spray PRN with no relief\par Denies otalgia, otorrhea, dizziness, vertigo, headaches related to hearing

## 2022-09-22 NOTE — REASON FOR VISIT
[Subsequent Evaluation] : a subsequent evaluation for [FreeTextEntry2] : Follow up for nasal polyps

## 2022-09-22 NOTE — PHYSICAL EXAM
[Nasal Endoscopy Performed] : nasal endoscopy was performed, see procedure section for findings [Midline] : trachea located in midline position [Normal] : no rashes [de-identified] : R effusion, L clear

## 2022-10-11 ENCOUNTER — APPOINTMENT (OUTPATIENT)
Dept: OTOLARYNGOLOGY | Facility: CLINIC | Age: 67
End: 2022-10-11

## 2022-10-11 VITALS
DIASTOLIC BLOOD PRESSURE: 82 MMHG | BODY MASS INDEX: 21.99 KG/M2 | SYSTOLIC BLOOD PRESSURE: 130 MMHG | WEIGHT: 112 LBS | HEART RATE: 56 BPM | HEIGHT: 60 IN

## 2022-10-11 DIAGNOSIS — H65.21 CHRONIC SEROUS OTITIS MEDIA, RIGHT EAR: ICD-10-CM

## 2022-10-11 PROCEDURE — 92557 COMPREHENSIVE HEARING TEST: CPT

## 2022-10-11 PROCEDURE — 99214 OFFICE O/P EST MOD 30 MIN: CPT | Mod: 25

## 2022-10-11 PROCEDURE — 69420 INCISION OF EARDRUM: CPT

## 2022-10-11 PROCEDURE — 92567 TYMPANOMETRY: CPT

## 2022-10-11 RX ORDER — DOXYCYCLINE 100 MG/1
100 CAPSULE ORAL
Qty: 14 | Refills: 0 | Status: DISCONTINUED | COMMUNITY
Start: 2022-09-22 | End: 2022-10-11

## 2022-10-15 NOTE — PHYSICAL EXAM
[Nasal Endoscopy Performed] : nasal endoscopy was performed, see procedure section for findings [Midline] : trachea located in midline position [Normal] : no rashes [de-identified] : angelika R OM

## 2022-10-15 NOTE — DATA REVIEWED
[de-identified] : 10/11: AD mild to mod CHL to profound mixed HL, AS normal to profound SNHL, tymps AD type B, AS type A

## 2022-10-15 NOTE — PROCEDURE
[FreeTextEntry1] : AD myringotomy [FreeTextEntry2] : MAXIME LAM [FreeTextEntry3] : Right tympanic membrane anesthetized with phenol\par Operating microscope was brought into position\par 5.5mm speculum used\par A small incision was made with a myringotomy blade in the anterior inferior quandrant of the TM\par Thick mucoid debris was suctioned\par Drops were placed with tragal pump\par Well-tolerated by patient

## 2022-10-15 NOTE — HISTORY OF PRESENT ILLNESS
[de-identified] : 67 year old female with Hx of recalcitrant CRSwNP presents for follow-up\par LCV 09/22/22\par Started Doxycycline but did not complete due to suspected allergic reaction (facial tingling, throat discomfort)\par Using mometasone rinses 2x a day \par Intermittent mild PND, improving \par No current nasal congestion, anterior rhinorrhea \par Sense of smell remains poor \par Right ear still clogged with increased pressure\par Denies otorrhea, dizziness, vertigo.\par \par

## 2022-11-09 ENCOUNTER — APPOINTMENT (OUTPATIENT)
Dept: OTOLARYNGOLOGY | Facility: CLINIC | Age: 67
End: 2022-11-09

## 2022-11-09 VITALS
BODY MASS INDEX: 20.43 KG/M2 | SYSTOLIC BLOOD PRESSURE: 124 MMHG | HEART RATE: 65 BPM | DIASTOLIC BLOOD PRESSURE: 81 MMHG | WEIGHT: 111 LBS | HEIGHT: 62 IN

## 2022-11-09 PROCEDURE — 99213 OFFICE O/P EST LOW 20 MIN: CPT | Mod: 25

## 2022-11-09 PROCEDURE — 31231 NASAL ENDOSCOPY DX: CPT

## 2022-11-09 RX ORDER — IPRATROPIUM BROMIDE 0.5 MG/2.5ML
0.02 SOLUTION RESPIRATORY (INHALATION)
Qty: 62 | Refills: 0 | Status: DISCONTINUED | COMMUNITY
Start: 2017-05-15 | End: 2022-11-09

## 2022-11-09 RX ORDER — MOMETASONE FUROATE 100 %
POWDER (GRAM) MISCELLANEOUS
Qty: 1 | Refills: 3 | Status: DISCONTINUED | COMMUNITY
Start: 2022-09-22 | End: 2022-11-09

## 2022-11-09 RX ORDER — CETIRIZINE HYDROCHLORIDE 10 MG/1
10 TABLET, FILM COATED ORAL
Qty: 1 | Refills: 0 | Status: DISCONTINUED | COMMUNITY
Start: 2022-06-30 | End: 2022-11-09

## 2022-11-09 RX ORDER — CIPROFLOXACIN AND DEXAMETHASONE 3; 1 MG/ML; MG/ML
0.3-0.1 SUSPENSION/ DROPS AURICULAR (OTIC)
Qty: 1 | Refills: 0 | Status: DISCONTINUED | COMMUNITY
Start: 2022-10-11 | End: 2022-11-09

## 2022-11-09 RX ORDER — CETIRIZINE HYDROCHLORIDE 10 MG/1
10 TABLET, FILM COATED ORAL
Qty: 30 | Refills: 2 | Status: DISCONTINUED | COMMUNITY
Start: 2022-06-30 | End: 2022-11-09

## 2022-11-09 NOTE — HISTORY OF PRESENT ILLNESS
[de-identified] : 67 year old female hx refractory CRSwNP presents for follow up \par LCV 10/11/22 at which time R myringotomy- prescribed Ciprodex \par Used Ciprodex in Right ear for 2 weeks\par Occasional R sharp pain and clogged sensation- no drainage\par Right ear hearing seems improved\par Intermittent R tinnitus. denies dizziness or vertigo  \par \par Using saline rinses 3-4x a week\par Mild congestion, minimal clear anterior rhinorrhea and PND- improving \par Denies facial pain/pressure\par Sense of smell remains poor\par \par \par

## 2022-11-09 NOTE — PHYSICAL EXAM
[de-identified] : L TM well-healed, no fluid, R TM normal [Nasal Endoscopy Performed] : nasal endoscopy was performed, see procedure section for findings [Midline] : trachea located in midline position [Normal] : no rashes

## 2022-11-22 ENCOUNTER — OFFICE (OUTPATIENT)
Dept: URBAN - METROPOLITAN AREA CLINIC 12 | Facility: CLINIC | Age: 67
Setting detail: OPHTHALMOLOGY
End: 2022-11-22
Payer: MEDICARE

## 2022-11-22 DIAGNOSIS — H43.393: ICD-10-CM

## 2022-11-22 DIAGNOSIS — H16.223: ICD-10-CM

## 2022-11-22 DIAGNOSIS — G43.009: ICD-10-CM

## 2022-11-22 DIAGNOSIS — H21.29: ICD-10-CM

## 2022-11-22 DIAGNOSIS — H11.152: ICD-10-CM

## 2022-11-22 DIAGNOSIS — H25.13: ICD-10-CM

## 2022-11-22 PROBLEM — H02.402 PTOSIS OF EYELID, UNSPECIFIED: Status: ACTIVE | Noted: 2022-11-22

## 2022-11-22 PROCEDURE — 92014 COMPRE OPH EXAM EST PT 1/>: CPT | Performed by: OPHTHALMOLOGY

## 2022-11-22 ASSESSMENT — REFRACTION_AUTOREFRACTION
OD_CYLINDER: -0.50
OS_AXIS: 100
OD_SPHERE: +1.50
OD_AXIS: 055
OS_SPHERE: +1.75
OS_CYLINDER: -0.50

## 2022-11-22 ASSESSMENT — CONFRONTATIONAL VISUAL FIELD TEST (CVF)
OD_FINDINGS: FULL
OS_FINDINGS: FULL

## 2022-11-22 ASSESSMENT — REFRACTION_CURRENTRX
OD_SPHERE: +2.00
OD_CYLINDER: -0.50
OS_VPRISM_DIRECTION: SV
OS_CYLINDER: -0.25
OS_SPHERE: +1.75
OD_AXIS: 065
OS_OVR_VA: 20/
OS_AXIS: 101
OD_OVR_VA: 20/
OD_VPRISM_DIRECTION: SV

## 2022-11-22 ASSESSMENT — KERATOMETRY
OS_K1POWER_DIOPTERS: 43.00
OD_AXISANGLE_DEGREES: 090
OS_AXISANGLE_DEGREES: 015
OS_K2POWER_DIOPTERS: 43.75
OD_K1POWER_DIOPTERS: 44.00
OD_K2POWER_DIOPTERS: 44.00

## 2022-11-22 ASSESSMENT — TONOMETRY
OS_IOP_MMHG: 13
OD_IOP_MMHG: 13

## 2022-11-22 ASSESSMENT — SUPERFICIAL PUNCTATE KERATITIS (SPK)
OS_SPK: 1+
OD_SPK: 1+

## 2022-11-22 ASSESSMENT — SPHEQUIV_DERIVED
OD_SPHEQUIV: 1.25
OS_SPHEQUIV: 1.5

## 2022-11-22 ASSESSMENT — VISUAL ACUITY
OD_BCVA: 20/20
OS_BCVA: 20/30-2

## 2022-11-22 ASSESSMENT — AXIALLENGTH_DERIVED
OS_AL: 23.0654
OD_AL: 22.9398

## 2023-02-21 ENCOUNTER — OFFICE (OUTPATIENT)
Dept: URBAN - METROPOLITAN AREA CLINIC 12 | Facility: CLINIC | Age: 68
Setting detail: OPHTHALMOLOGY
End: 2023-02-21
Payer: MEDICARE

## 2023-02-21 DIAGNOSIS — H25.13: ICD-10-CM

## 2023-02-21 DIAGNOSIS — G43.009: ICD-10-CM

## 2023-02-21 DIAGNOSIS — H11.152: ICD-10-CM

## 2023-02-21 DIAGNOSIS — H21.29: ICD-10-CM

## 2023-02-21 DIAGNOSIS — H16.223: ICD-10-CM

## 2023-02-21 DIAGNOSIS — H43.393: ICD-10-CM

## 2023-02-21 PROCEDURE — 92012 INTRM OPH EXAM EST PATIENT: CPT | Performed by: OPHTHALMOLOGY

## 2023-02-21 ASSESSMENT — REFRACTION_CURRENTRX
OS_CYLINDER: -0.25
OD_SPHERE: +2.00
OD_VPRISM_DIRECTION: SV
OS_OVR_VA: 20/
OD_CYLINDER: -0.50
OD_OVR_VA: 20/
OS_OVR_VA: 20/
OS_SPHERE: +1.75
OD_CYLINDER: -0.50
OS_VPRISM_DIRECTION: SV
OD_SPHERE: +3.75
OS_CYLINDER: -0.25
OD_VPRISM_DIRECTION: SV
OD_OVR_VA: 20/
OD_AXIS: 065
OS_VPRISM_DIRECTION: SV
OS_AXIS: 101
OD_AXIS: 61
OS_SPHERE: +3.50
OS_AXIS: 86

## 2023-02-21 ASSESSMENT — REFRACTION_AUTOREFRACTION
OS_SPHERE: +1.75
OS_AXIS: 117
OD_CYLINDER: -0.25
OD_AXIS: 71
OD_SPHERE: +1.25
OS_CYLINDER: -0.25

## 2023-02-21 ASSESSMENT — SPHEQUIV_DERIVED
OD_SPHEQUIV: 1.125
OS_SPHEQUIV: 1.125
OD_SPHEQUIV: 1.125
OS_SPHEQUIV: 1.625

## 2023-02-21 ASSESSMENT — AXIALLENGTH_DERIVED
OD_AL: 23.0734
OS_AL: 23.2059
OD_AL: 23.0734
OS_AL: 23.019

## 2023-02-21 ASSESSMENT — REFRACTION_MANIFEST
OS_VA1: 20/20-2
OD_SPHERE: +1.25
OS_AXIS: 090
OD_AXIS: 065
OD_ADD: +2.50
OS_CYLINDER: -0.25
OD_CYLINDER: -0.25
OS_SPHERE: +1.25
OD_VA1: 20/20-2
OS_ADD: +2.50

## 2023-02-21 ASSESSMENT — TONOMETRY
OS_IOP_MMHG: 14
OD_IOP_MMHG: 16

## 2023-02-21 ASSESSMENT — SUPERFICIAL PUNCTATE KERATITIS (SPK)
OS_SPK: 1+
OD_SPK: 1+

## 2023-02-21 ASSESSMENT — KERATOMETRY
OD_AXISANGLE_DEGREES: 165
OS_K1POWER_DIOPTERS: 43.00
OS_AXISANGLE_DEGREES: 19
OD_K2POWER_DIOPTERS: 44.00
OD_K1POWER_DIOPTERS: 43.50
OS_K2POWER_DIOPTERS: 43.75

## 2023-02-21 ASSESSMENT — CONFRONTATIONAL VISUAL FIELD TEST (CVF)
OD_FINDINGS: FULL
OS_FINDINGS: FULL

## 2023-02-21 ASSESSMENT — VISUAL ACUITY
OD_BCVA: 20/40-1
OS_BCVA: 20/30-1

## 2023-02-28 NOTE — IMPRESSION
"Subjective    Ricardo Hugo is a 77 y.o. male. Fu of ihd and risks    History of Present Illness     IHD:  Has stable stamina. Walking is limited by stable claudication. No cp or unusual soa. Stable meds and EKG today is nsc.    HTN:  Increasing trouble controlling and pcp is adjusting meds. Home readings are '140-170/58-70\". Is adherent to meds.    HLD:  Is on a mod potent statin and LDL=45 1 mo ago    LVDD:  Has no signs of chf and no unusual soa or edema. Have discussed adding SGLT-2 inh med for this issue and to protect his kidneys in light of his advanced CKD. He does not think he can afford these meds but will check further.        The following portions of the patient's history were reviewed and updated as appropriate: allergies, current medications, past family history, past medical history, past social history, past surgical history and problem list.    Patient Active Problem List   Diagnosis   • Chronic rhinitis   • Acute renal failure superimposed on stage 4 chronic kidney disease (HCC)   • Moderate malnutrition ([facility])   • Resistant hypertension   • Sepsis ([facility])   • Chronic diarrhea   • Crohn's disease of large intestine with other complication ([facility])   • Symptomatic anemia   • CKD (chronic kidney disease) stage 4, GFR 15-29 ml/min ([facility])   • Bruit of right carotid artery   • Wellness examination   • PAD (peripheral artery disease) ([facility])   • IHD (ischemic heart disease)   • Carotid stenosis   • Stenosis of right carotid artery   • Carotid stenosis, right   • Avascular necrosis of bone of hip (HCC)   • Diastolic dysfunction   • Shortness of breath   • CRD (chronic renal disease), stage IV ([facility])   • Thrombocytopenia (HCC)   • History of alcoholism ([facility])   • Anemia due to chronic kidney disease   • Copper deficiency   • Low iron    • CLL (chronic lymphocytic leukemia) ([facility])   • Pulmonary hypertension (HCC)   • Perforation of left tympanic membrane   • Mixed hyperlipidemia   • Systolic murmur       Allergies "   Allergen Reactions   • Ondansetron Anaphylaxis and GI Intolerance   • Zofran [Ondansetron Hcl] Anaphylaxis   • Lortab [Hydrocodone-Acetaminophen] Other (See Comments) and Hallucinations     CLOSTROPHOBIC   • Allopurinol Other (See Comments)     Pain on right side       Family History   Problem Relation Age of Onset   • No Known Problems Mother    • No Known Problems Father    • No Known Problems Daughter    • Colon cancer Neg Hx    • Colon polyps Neg Hx        Social History     Socioeconomic History   • Marital status:    Tobacco Use   • Smoking status: Former     Packs/day: 0.50     Years: 25.00     Pack years: 12.50     Types: Cigarettes     Start date:      Quit date: 10/13/2013     Years since quittin.3   • Smokeless tobacco: Never   • Tobacco comments:     quit 2013   Vaping Use   • Vaping Use: Never used   Substance and Sexual Activity   • Alcohol use: Not Currently   • Drug use: No   • Sexual activity: Not Currently     Partners: Female         Current Outpatient Medications:   •  albuterol sulfate  (90 Base) MCG/ACT inhaler, USE 2 INHALATIONS FOUR TIMES A DAY AS NEEDED, Disp: 20.1 g, Rfl: 3  •  ALPRAZolam (XANAX) 0.25 MG tablet, TAKE 1 TABLET BY MOUTH AT NIGHT AS NEEDED FOR ANXIETY, Disp: 30 tablet, Rfl: 2  •  amLODIPine (NORVASC) 10 MG tablet, Take 1 tablet by mouth Daily., Disp: 90 tablet, Rfl: 3  •  aspirin (aspirin) 81 MG EC tablet, Take  by mouth Daily., Disp: , Rfl:   •  atorvastatin (LIPITOR) 10 MG tablet, Take 1 tablet by mouth Daily., Disp: 90 tablet, Rfl: 3  •  azelastine (ASTELIN) 0.1 % nasal spray, USE 1 SPRAY IN EACH NOSTRIL TWICE A DAY AS NEEDED, Disp: 90 mL, Rfl: 1  •  cholestyramine (QUESTRAN) 4 g packet, Take 1 packet by mouth Daily., Disp: 90 packet, Rfl: 3  •  cloNIDine (CATAPRES) 0.2 MG tablet, Take 3 tablets by mouth Daily., Disp: 270 tablet, Rfl: 3  •  clopidogrel (PLAVIX) 75 MG tablet, TAKE 1 TABLET DAILY, Disp: 90 tablet, Rfl: 3  •  Copper Gluconate (Copper  Caps) 2 MG capsule, Take  by mouth., Disp: , Rfl:   •  diphenhydrAMINE (BENADRYL) 25 mg capsule, Take 1 capsule by mouth Every 6 (Six) Hours As Needed for Itching., Disp: , Rfl:   •  fexofenadine (ALLEGRA) 180 MG tablet, Take 1 tablet by mouth Daily., Disp: , Rfl:   •  fluticasone (FLONASE) 50 MCG/ACT nasal spray, 2 sprays into the nostril(s) as directed by provider Daily As Needed for Rhinitis., Disp: , Rfl:   •  furosemide (Lasix) 20 MG tablet, Take 1 tablet by mouth 2 (Two) Times a Day., Disp: 180 tablet, Rfl: 3  •  hydrALAZINE (APRESOLINE) 25 MG tablet, Take 1 tablet by mouth 3 (Three) Times a Day. (Patient taking differently: Take 4 tablets by mouth 3 (Three) Times a Day. 100mg daily), Disp: 90 tablet, Rfl: 5  •  levothyroxine (Synthroid) 75 MCG tablet, Take 1 tablet by mouth Daily., Disp: 90 tablet, Rfl: 3  •  magnesium chloride ER 64 MG DR tablet, Take 143 mg by mouth Daily., Disp: , Rfl:   •  Melatonin 10 MG capsule, Take 2 capsules by mouth Every Night., Disp: , Rfl:   •  mesalamine (APRISO) 0.375 g 24 hr capsule, Take 4 capsules by mouth Daily., Disp: 360 capsule, Rfl: 3  •  montelukast (SINGULAIR) 10 MG tablet, TAKE 1 TABLET BY MOUTH EVERY NIGHT, Disp: 30 tablet, Rfl: 11  •  Multiple Vitamins-Minerals (PRESERVISION/LUTEIN) capsule, Take 1 capsule by mouth 2 (two) times a day., Disp: , Rfl:   •  omeprazole (priLOSEC) 20 MG capsule, TAKE 1 CAPSULE DAILY, Disp: 90 capsule, Rfl: 1  •  Oxymetazoline HCl (Nasal Spray) 0.05 % solution, 2 squirts to each nostril twice a day x10 days, Disp: , Rfl:   •  potassium chloride 10 MEQ CR tablet, Take 1 tablet by mouth 2 (Two) Times a Day., Disp: 180 tablet, Rfl: 3  •  sodium bicarbonate 650 MG tablet, Take 0.5 tablets by mouth 2 (Two) Times a Day. (Patient taking differently: Take 325 mg by mouth 3 (Three) Times a Day.), Disp: 180 tablet, Rfl: 3  •  valsartan (DIOVAN) 160 MG tablet, Take 1 tablet by mouth Daily., Disp: 90 tablet, Rfl: 3  •  vitamin D (ERGOCALCIFEROL)  1.25 MG (03035 UT) capsule capsule, Take 1 capsule by mouth 1 (One) Time Per Week., Disp: 15 capsule, Rfl: 3  •  carvedilol (COREG) 25 MG tablet, Take 1 tablet by mouth 2 (Two) Times a Day., Disp: 60 tablet, Rfl: 11    Past Surgical History:   Procedure Laterality Date   • ARTERY SURGERY  2021    right side on neck   • CAROTID ENDARTERECTOMY Right 5/10/2021    Procedure: RIGHT CAROTID ENDARTERECTOMY WITH EEG;  Surgeon: Gil Pineda DO;  Location: [facility] HYBRID OR 12;  Service: Vascular;  Laterality: Right;   • COLONOSCOPY N/A 7/2/2020    Procedure: COLONOSCOPY WITH ANESTHESIA;  Surgeon: Adrien Brewster MD;  Location: [facility] ENDOSCOPY;  Service: Gastroenterology;  Laterality: N/A;  pre op: diarrhea  post op: polyps  PCP: Joe Velasco MD   • COLONOSCOPY N/A 10/13/2020    Procedure: COLONOSCOPY WITH ANESTHESIA;  Surgeon: Adrien Brewster MD;  Location: [facility] ENDOSCOPY;  Service: Gastroenterology;  Laterality: N/A;  Pre: Chronic Diarrhea, Crohn's  Post: AVM  Dr. eNftali Velasco  CO2 Inflation Used   • CORONARY ARTERY BYPASS GRAFT  2003    x3   • ENDOSCOPY N/A 11/2/2021    Procedure: ESOPHAGOGASTRODUODENOSCOPY WITH ANESTHESIA;  Surgeon: Bridger Bell MD;  Location: [facility] ENDOSCOPY;  Service: Gastroenterology;  Laterality: N/A;  pre anemia;gi bleed  post  gi bleed;schatski ring  Dr. ERIC Velasco   • EYE SURGERY Bilateral     catorac   • INCISION AND DRAINAGE PERIRECTAL ABSCESS N/A 3/3/2017    Procedure: INCISION AND DRAINAGE OF JEET ANAL ABSCESS;  Surgeon: Lynette Smith MD;  Location: [facility] OR;  Service:    • MYRINGOTOMY W/ TUBES Left 04/17/2017    06/10/2016   • TONSILLECTOMY     • TOTAL HIP ARTHROPLASTY Right 2006       Review of Systems   Constitutional: Positive for activity change. Negative for appetite change, fatigue and unexpected weight change.   Respiratory: Negative for shortness of breath and wheezing.    Cardiovascular: Negative for chest pain, palpitations and leg swelling.  "  Gastrointestinal: Negative for abdominal pain and blood in stool.   Genitourinary: Negative for difficulty urinating and hematuria.   Musculoskeletal: Positive for myalgias.       /61   Pulse 55   Ht 182.9 cm (72\")   Wt 74.4 kg (164 lb)   BMI 22.24 kg/m²   Procedures    Objective   Physical Exam  Constitutional:       Appearance: Normal appearance.   Cardiovascular:      Rate and Rhythm: Normal rate and regular rhythm.      Pulses: Decreased pulses.      Heart sounds: Murmur heard.    Systolic murmur is present with a grade of 2/6.   No diastolic murmur is present.    No friction rub. No gallop.      Comments: DONNA at llsb to apex  Pulmonary:      Effort: Pulmonary effort is normal.      Breath sounds: Normal breath sounds. No wheezing or rales.   Abdominal:      General: Bowel sounds are normal.      Tenderness: There is no abdominal tenderness.   Musculoskeletal:      Right lower leg: No edema.      Left lower leg: No edema.   Skin:     General: Skin is warm.   Neurological:      General: No focal deficit present.   Psychiatric:         Mood and Affect: Mood normal.         Assessment & Plan   Diagnoses and all orders for this visit:    1. IHD (ischemic heart disease) (Primary)  Comments:  no angina  Orders:  -     ECG 12 Lead    2. Diastolic dysfunction  Comments:  no evidence of chf - add Farxiga for cardio-renal protection with samples and script to follow    3. PAD (peripheral artery disease) (HCC)  Comments:  stable claudication    4. Mixed hyperlipidemia  Comments:  good LDL control    5. Hypertension, benign  -     carvedilol (COREG) 25 MG tablet; Take 1 tablet by mouth 2 (Two) Times a Day.  Dispense: 60 tablet; Refill: 11    6. Resistant hypertension        mdm-mod - review of records and home bp and changing meds for better control.         Return in about 1 year (around 2/28/2024).  Orders Placed This Encounter   Procedures   • ECG 12 Lead     Order Specific Question:   Reason for Exam:     " Answer:   IHD.HTN     Order Specific Question:   Release to patient     Answer:   Routine Release      [] : molds [Allergy Testing Dust Mite] : dust mites [Allergy Testing Mixed Feathers] : feathers [Allergy Testing Dog] : dog [Allergy Testing Cockroach] : cockroach [Allergy Testing Cat] : cat [Allergy Testing Trees] : trees [Allergy Testing Weeds] : weeds [Allergy Testing Grasses] : grasses

## 2023-03-21 ENCOUNTER — APPOINTMENT (OUTPATIENT)
Dept: OTOLARYNGOLOGY | Facility: CLINIC | Age: 68
End: 2023-03-21
Payer: MEDICARE

## 2023-03-21 VITALS
DIASTOLIC BLOOD PRESSURE: 71 MMHG | HEIGHT: 62 IN | WEIGHT: 111 LBS | HEART RATE: 101 BPM | BODY MASS INDEX: 20.43 KG/M2 | SYSTOLIC BLOOD PRESSURE: 117 MMHG

## 2023-03-21 PROCEDURE — 99212 OFFICE O/P EST SF 10 MIN: CPT

## 2023-03-22 NOTE — HISTORY OF PRESENT ILLNESS
[de-identified] : 67 year old woman with Hx refractory CRSwNP presents for follow up \par LCV 11/09/2022\par Hx R myringotomy 10/11/22--R TM healed\par Report bilateral ear fullness and decreased hearing for about one month--Right worse than Left\par Reports bilateral tinnitus and otorrhea from the which ever ear she sleeps on(unsure of color). \par Denies dizziness or vertigo.\par \par Using saline rinses  3-4x a week\par Mild congestion, clear anterior rhinorrhea and PND without mucus production--improving \par Denies sinus pain/pressure, epistaxis, recent sinus infections.\par Sense of smell remains poor

## 2023-03-22 NOTE — PHYSICAL EXAM
[de-identified] : Matt ROBERTSON R>L [Nasal Endoscopy Performed] : nasal endoscopy was performed, see procedure section for findings [Midline] : trachea located in midline position [Normal] : no rashes

## 2023-04-27 ENCOUNTER — APPOINTMENT (OUTPATIENT)
Dept: OTOLARYNGOLOGY | Facility: CLINIC | Age: 68
End: 2023-04-27
Payer: MEDICARE

## 2023-04-27 VITALS
HEIGHT: 62 IN | WEIGHT: 112 LBS | BODY MASS INDEX: 20.61 KG/M2 | SYSTOLIC BLOOD PRESSURE: 127 MMHG | DIASTOLIC BLOOD PRESSURE: 83 MMHG | OXYGEN SATURATION: 98 % | HEART RATE: 70 BPM

## 2023-04-27 DIAGNOSIS — J32.4 CHRONIC PANSINUSITIS: ICD-10-CM

## 2023-04-27 PROCEDURE — 31231 NASAL ENDOSCOPY DX: CPT

## 2023-04-27 PROCEDURE — 99214 OFFICE O/P EST MOD 30 MIN: CPT | Mod: 25

## 2023-04-27 RX ORDER — AMOXICILLIN AND CLAVULANATE POTASSIUM 875; 125 MG/1; MG/1
875-125 TABLET, COATED ORAL
Qty: 14 | Refills: 0 | Status: COMPLETED | COMMUNITY
Start: 2023-03-21 | End: 2023-04-27

## 2023-04-28 PROBLEM — J32.4 CHRONIC PANSINUSITIS: Status: ACTIVE | Noted: 2021-07-26

## 2023-04-28 NOTE — HISTORY OF PRESENT ILLNESS
[de-identified] : 67 year old woman, with hx of chronic pansinusitis presents for follow-up for refractory CRSwNP\par s/p Right myringotomy in the past, 10/11/22 - Right TM healed\par LCV 3/21/23 at which time was started on oral Augmentin and steroid rinses - Mometasone (subsequent UTI with use of antibiotics)\par Reports breathing has improved - mild nasal congestion - using Saline nasal spray - having intermittent ear popping (Right more than Left - hearing improved)\par Denies anterior rhinorrhea or PND, facial pain and pressure, poor sense of smell, epistaxis or recent fevers\par No recent sinus infections

## 2023-04-28 NOTE — PHYSICAL EXAM
[Nasal Endoscopy Performed] : nasal endoscopy was performed, see procedure section for findings [Midline] : trachea located in midline position [Normal] : no rashes [de-identified] : trace R AILYN

## 2023-05-10 ENCOUNTER — APPOINTMENT (OUTPATIENT)
Dept: OBGYN | Facility: CLINIC | Age: 68
End: 2023-05-10
Payer: MEDICARE

## 2023-05-10 VITALS
BODY MASS INDEX: 21.19 KG/M2 | HEART RATE: 66 BPM | WEIGHT: 115.13 LBS | DIASTOLIC BLOOD PRESSURE: 75 MMHG | HEIGHT: 62 IN | SYSTOLIC BLOOD PRESSURE: 124 MMHG

## 2023-05-10 DIAGNOSIS — Z87.09 PERSONAL HISTORY OF OTHER DISEASES OF THE RESPIRATORY SYSTEM: ICD-10-CM

## 2023-05-10 DIAGNOSIS — Z80.0 FAMILY HISTORY OF MALIGNANT NEOPLASM OF DIGESTIVE ORGANS: ICD-10-CM

## 2023-05-10 DIAGNOSIS — Z92.29 PERSONAL HISTORY OF OTHER DRUG THERAPY: ICD-10-CM

## 2023-05-10 DIAGNOSIS — Z01.89 ENCOUNTER FOR OTHER SPECIFIED SPECIAL EXAMINATIONS: ICD-10-CM

## 2023-05-10 DIAGNOSIS — J32.1 CHRONIC FRONTAL SINUSITIS: ICD-10-CM

## 2023-05-10 DIAGNOSIS — K64.4 RESIDUAL HEMORRHOIDAL SKIN TAGS: ICD-10-CM

## 2023-05-10 DIAGNOSIS — Z01.818 ENCOUNTER FOR OTHER PREPROCEDURAL EXAMINATION: ICD-10-CM

## 2023-05-10 DIAGNOSIS — Z01.419 ENCOUNTER FOR GYNECOLOGICAL EXAMINATION (GENERAL) (ROUTINE) W/OUT ABNORMAL FINDINGS: ICD-10-CM

## 2023-05-10 DIAGNOSIS — Z02.82 ENCOUNTER FOR ADOPTION SERVICES: ICD-10-CM

## 2023-05-10 PROCEDURE — G0101: CPT

## 2023-05-10 PROCEDURE — 99213 OFFICE O/P EST LOW 20 MIN: CPT | Mod: 25

## 2023-05-10 NOTE — HISTORY OF PRESENT ILLNESS
[TextBox_19] : per pt 1 year ago- not posted [PapSmeardate] : Sept 2020 [TextBox_31] : neg pap and HPV [TextBox_37] : per pt one year ago [TextBox_43] : per pt, she is " due"

## 2023-05-10 NOTE — PHYSICAL EXAM
[Chaperone Present] : A chaperone was present in the examining room during all aspects of the physical examination [Appropriately responsive] : appropriately responsive [Alert] : alert [No Acute Distress] : no acute distress [Soft] : soft [Non-tender] : non-tender [No Lesions] : no lesions [Oriented x3] : oriented x3 [Examination Of The Breasts] : a normal appearance [No Discharge] : no discharge [No Masses] : no breast masses were palpable [Labia Majora] : normal [Labia Minora] : normal [Normal] : normal [No Bleeding] : There was no active vaginal bleeding [Absent] : absent [Uterine Adnexae] : non-palpable [FreeTextEntry5] : intolerant of speculum, unable to view portio

## 2023-05-11 LAB
CANDIDA VAG CYTO: NOT DETECTED
G VAGINALIS+PREV SP MTYP VAG QL MICRO: NOT DETECTED
T VAGINALIS VAG QL WET PREP: NOT DETECTED

## 2023-05-18 ENCOUNTER — NON-APPOINTMENT (OUTPATIENT)
Age: 68
End: 2023-05-18

## 2023-05-18 LAB
BACTERIA UR CULT: NORMAL
C TRACH RRNA SPEC QL NAA+PROBE: NOT DETECTED
CYTOLOGY CVX/VAG DOC THIN PREP: ABNORMAL
HPV HIGH+LOW RISK DNA PNL CVX: NOT DETECTED
N GONORRHOEA RRNA SPEC QL NAA+PROBE: NOT DETECTED
SOURCE AMPLIFICATION: NORMAL

## 2023-06-22 ENCOUNTER — OFFICE (OUTPATIENT)
Dept: URBAN - METROPOLITAN AREA CLINIC 12 | Facility: CLINIC | Age: 68
Setting detail: OPHTHALMOLOGY
End: 2023-06-22
Payer: MEDICARE

## 2023-06-22 DIAGNOSIS — G43.009: ICD-10-CM

## 2023-06-22 DIAGNOSIS — H11.152: ICD-10-CM

## 2023-06-22 DIAGNOSIS — H43.393: ICD-10-CM

## 2023-06-22 DIAGNOSIS — H25.13: ICD-10-CM

## 2023-06-22 DIAGNOSIS — H16.223: ICD-10-CM

## 2023-06-22 DIAGNOSIS — H21.29: ICD-10-CM

## 2023-06-22 PROCEDURE — 99213 OFFICE O/P EST LOW 20 MIN: CPT | Performed by: OPHTHALMOLOGY

## 2023-06-22 ASSESSMENT — REFRACTION_AUTOREFRACTION
OD_SPHERE: +1.25
OS_AXIS: 097
OS_SPHERE: +1.75
OD_AXIS: 080
OD_CYLINDER: -0.50
OS_CYLINDER: -0.25

## 2023-06-22 ASSESSMENT — REFRACTION_MANIFEST
OD_AXIS: 065
OD_SPHERE: +1.25
OS_CYLINDER: -0.25
OD_VA1: 20/20-2
OD_ADD: +2.50
OS_SPHERE: +1.25
OD_CYLINDER: -0.25
OS_ADD: +2.50
OS_VA1: 20/20-2
OS_AXIS: 090

## 2023-06-22 ASSESSMENT — KERATOMETRY
OS_K2POWER_DIOPTERS: 43.75
OS_AXISANGLE_DEGREES: 005
OD_K1POWER_DIOPTERS: 43.00
OS_K1POWER_DIOPTERS: 43.00
OD_K2POWER_DIOPTERS: 43.75
OD_AXISANGLE_DEGREES: 171

## 2023-06-22 ASSESSMENT — REFRACTION_CURRENTRX
OS_CYLINDER: -0.25
OD_CYLINDER: -0.50
OS_SPHERE: +1.75
OS_VPRISM_DIRECTION: SV
OS_AXIS: 86
OS_OVR_VA: 20/
OS_VPRISM_DIRECTION: SV
OD_SPHERE: +3.75
OD_AXIS: 61
OS_OVR_VA: 20/
OD_CYLINDER: -0.50
OS_AXIS: 101
OD_VPRISM_DIRECTION: SV
OS_CYLINDER: -0.25
OD_AXIS: 065
OD_OVR_VA: 20/
OS_SPHERE: +3.50
OD_SPHERE: +2.00
OD_VPRISM_DIRECTION: SV
OD_OVR_VA: 20/

## 2023-06-22 ASSESSMENT — SPHEQUIV_DERIVED
OD_SPHEQUIV: 1
OD_SPHEQUIV: 1.125
OS_SPHEQUIV: 1.625
OS_SPHEQUIV: 1.125

## 2023-06-22 ASSESSMENT — AXIALLENGTH_DERIVED
OD_AL: 23.2059
OS_AL: 23.019
OD_AL: 23.2531
OS_AL: 23.2059

## 2023-06-22 ASSESSMENT — VISUAL ACUITY
OD_BCVA: 20/30+
OS_BCVA: 20/20

## 2023-06-22 ASSESSMENT — SUPERFICIAL PUNCTATE KERATITIS (SPK)
OS_SPK: 1+
OD_SPK: 1+

## 2023-06-22 ASSESSMENT — CONFRONTATIONAL VISUAL FIELD TEST (CVF)
OD_FINDINGS: FULL
OS_FINDINGS: FULL

## 2023-06-22 ASSESSMENT — TONOMETRY
OD_IOP_MMHG: 14
OS_IOP_MMHG: 15

## 2023-06-27 ENCOUNTER — OFFICE (OUTPATIENT)
Dept: URBAN - METROPOLITAN AREA CLINIC 100 | Facility: CLINIC | Age: 68
Setting detail: OPHTHALMOLOGY
End: 2023-06-27
Payer: MEDICARE

## 2023-06-27 ENCOUNTER — RX ONLY (RX ONLY)
Age: 68
End: 2023-06-27

## 2023-06-27 DIAGNOSIS — H16.223: ICD-10-CM

## 2023-06-27 DIAGNOSIS — H04.223: ICD-10-CM

## 2023-06-27 DIAGNOSIS — H04.551: ICD-10-CM

## 2023-06-27 PROCEDURE — 68840 EXPLORE/IRRIGATE TEAR DUCTS: CPT | Performed by: OPHTHALMOLOGY

## 2023-06-27 PROCEDURE — 99213 OFFICE O/P EST LOW 20 MIN: CPT | Performed by: OPHTHALMOLOGY

## 2023-06-27 ASSESSMENT — KERATOMETRY
OS_K1POWER_DIOPTERS: 43.00
OD_K2POWER_DIOPTERS: 43.75
OS_K2POWER_DIOPTERS: 43.75
OD_K1POWER_DIOPTERS: 43.00
OD_AXISANGLE_DEGREES: 171
OS_AXISANGLE_DEGREES: 005

## 2023-06-27 ASSESSMENT — AXIALLENGTH_DERIVED
OD_AL: 23.2531
OS_AL: 23.019

## 2023-06-27 ASSESSMENT — SPHEQUIV_DERIVED
OD_SPHEQUIV: 1
OS_SPHEQUIV: 1.625

## 2023-06-27 ASSESSMENT — REFRACTION_AUTOREFRACTION
OD_SPHERE: +1.25
OD_AXIS: 080
OS_CYLINDER: -0.25
OD_CYLINDER: -0.50
OS_AXIS: 097
OS_SPHERE: +1.75

## 2023-06-27 ASSESSMENT — VISUAL ACUITY
OS_BCVA: 20/20-3
OD_BCVA: 20/30-2

## 2023-06-27 ASSESSMENT — SUPERFICIAL PUNCTATE KERATITIS (SPK)
OS_SPK: 1+
OD_SPK: 1+

## 2023-06-27 ASSESSMENT — CONFRONTATIONAL VISUAL FIELD TEST (CVF)
OD_FINDINGS: FULL
OS_FINDINGS: FULL

## 2023-07-06 ENCOUNTER — OFFICE (OUTPATIENT)
Dept: URBAN - METROPOLITAN AREA CLINIC 6 | Facility: CLINIC | Age: 68
Setting detail: OPHTHALMOLOGY
End: 2023-07-06
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
## 2023-07-06 PROCEDURE — NO SHOW FE NO SHOW FEE: Performed by: OPHTHALMOLOGY

## 2023-07-12 RX ORDER — MOMETASONE FUROATE 100 %
POWDER (GRAM) MISCELLANEOUS
Qty: 1 | Refills: 1 | Status: ACTIVE | COMMUNITY
Start: 2023-03-21 | End: 1900-01-01

## 2023-09-27 ENCOUNTER — OFFICE (OUTPATIENT)
Dept: URBAN - METROPOLITAN AREA CLINIC 6 | Facility: CLINIC | Age: 68
Setting detail: OPHTHALMOLOGY
End: 2023-09-27
Payer: MEDICARE

## 2023-09-27 VITALS — HEIGHT: 55 IN

## 2023-09-27 DIAGNOSIS — H53.2: ICD-10-CM

## 2023-09-27 DIAGNOSIS — H49.12: ICD-10-CM

## 2023-09-27 DIAGNOSIS — H01.004: ICD-10-CM

## 2023-09-27 DIAGNOSIS — H16.223: ICD-10-CM

## 2023-09-27 DIAGNOSIS — H50.22: ICD-10-CM

## 2023-09-27 DIAGNOSIS — H52.03: ICD-10-CM

## 2023-09-27 DIAGNOSIS — H01.001: ICD-10-CM

## 2023-09-27 PROCEDURE — 92015 DETERMINE REFRACTIVE STATE: CPT | Performed by: OPHTHALMOLOGY

## 2023-09-27 PROCEDURE — 92060 SENSORIMOTOR EXAMINATION: CPT | Performed by: OPHTHALMOLOGY

## 2023-09-27 PROCEDURE — 99214 OFFICE O/P EST MOD 30 MIN: CPT | Performed by: OPHTHALMOLOGY

## 2023-09-27 ASSESSMENT — TONOMETRY
OD_IOP_MMHG: 13
OS_IOP_MMHG: 14

## 2023-09-27 ASSESSMENT — SUPERFICIAL PUNCTATE KERATITIS (SPK)
OS_SPK: 1+
OD_SPK: 1+

## 2023-09-27 ASSESSMENT — LID EXAM ASSESSMENTS
OD_BLEPHARITIS: RUL T
OS_BLEPHARITIS: LUL T

## 2023-09-27 ASSESSMENT — CONFRONTATIONAL VISUAL FIELD TEST (CVF)
OS_FINDINGS: FULL
OD_FINDINGS: FULL

## 2023-09-28 ASSESSMENT — REFRACTION_CURRENTRX
OD_CYLINDER: -0.25
OS_VPRISM_DIRECTION: SV
OS_SPHERE: +3.25
OD_OVR_VA: 20/
OS_CYLINDER: -0.25
OS_VPRISM_DIRECTION: SV
OS_AXIS: 090
OD_OVR_VA: 20/
OS_CYLINDER: -0.25
OD_AXIS: 065
OD_SPHERE: +1.25
OS_SPHERE: +1.25
OD_VPRISM_DIRECTION: SV
OS_OVR_VA: 20/
OD_AXIS: 065
OD_SPHERE: +3.25
OS_OVR_VA: 20/
OS_AXIS: 090
OD_CYLINDER: -1.25
OD_VPRISM_DIRECTION: SV

## 2023-09-28 ASSESSMENT — REFRACTION_MANIFEST
OS_VPRISM: BD
OS_VPRISM: BD
OD_VA1: 20/20
OD_VA1: 20/20
OS_SPHERE: +1.50
OD_CYLINDER: -0.75
OS_CYLINDER: -0.50
OD_CYLINDER: -0.75
OS_VA1: 20/20
OD_SPHERE: +1.00
OS_AXIS: 090
OS_CYLINDER: -0.50
OS_SPHERE: +3.50
OS_AXIS: 090
OD_AXIS: 090
OD_AXIS: 090
OD_SPHERE: +3.00
OS_VA1: 20/20

## 2023-09-28 ASSESSMENT — KERATOMETRY
OD_K2POWER_DIOPTERS: 44.00
OS_K2POWER_DIOPTERS: 43.75
OD_AXISANGLE_DEGREES: 175
METHOD_AUTO_MANUAL: AUTO
OS_AXISANGLE_DEGREES: 010
OD_K1POWER_DIOPTERS: 43.25
OS_K1POWER_DIOPTERS: 43.00

## 2023-09-28 ASSESSMENT — AXIALLENGTH_DERIVED
OS_AL: 23.1589
OD_AL: 23.2586
OS_AL: 22.4316
OD_AL: 23.306
OD_AL: 22.5696
OS_AL: 23.1589

## 2023-09-28 ASSESSMENT — REFRACTION_AUTOREFRACTION
OD_CYLINDER: -0.50
OS_SPHERE: +1.50
OS_AXIS: 095
OD_SPHERE: +1.00
OD_AXIS: 080
OS_CYLINDER: -0.50

## 2023-09-28 ASSESSMENT — SPHEQUIV_DERIVED
OD_SPHEQUIV: 0.75
OS_SPHEQUIV: 1.25
OD_SPHEQUIV: 2.625
OS_SPHEQUIV: 3.25
OS_SPHEQUIV: 1.25
OD_SPHEQUIV: 0.625

## 2023-09-28 ASSESSMENT — VISUAL ACUITY
OS_BCVA: 20/30-2
OD_BCVA: 20/20-1

## 2024-06-07 ENCOUNTER — APPOINTMENT (OUTPATIENT)
Dept: OBGYN | Facility: CLINIC | Age: 69
End: 2024-06-07
Payer: MEDICARE

## 2024-06-07 VITALS
HEART RATE: 60 BPM | WEIGHT: 111 LBS | DIASTOLIC BLOOD PRESSURE: 77 MMHG | BODY MASS INDEX: 20.43 KG/M2 | HEIGHT: 62 IN | SYSTOLIC BLOOD PRESSURE: 114 MMHG

## 2024-06-07 DIAGNOSIS — R10.2 PELVIC AND PERINEAL PAIN: ICD-10-CM

## 2024-06-07 DIAGNOSIS — R31.9 HEMATURIA, UNSPECIFIED: ICD-10-CM

## 2024-06-07 PROCEDURE — 99213 OFFICE O/P EST LOW 20 MIN: CPT

## 2024-06-07 RX ORDER — FLUTICASONE PROPIONATE AND SALMETEROL XINAFOATE 230; 21 UG/1; UG/1
AEROSOL, METERED RESPIRATORY (INHALATION)
Refills: 0 | Status: ACTIVE | COMMUNITY

## 2024-06-07 NOTE — PLAN
[FreeTextEntry1] : Urine POCT with moderate blood Will send urine culture Fluids stressed Will f/u pending culture

## 2024-06-07 NOTE — PHYSICAL EXAM
[Chaperone Present] : A chaperone was present in the examining room during all aspects of the physical examination [34126] : A chaperone was present during the pelvic exam. [Labia Majora] : normal [Labia Minora] : normal [Normal] :  normal [Absent] : absent [Uterine Adnexae] : non-palpable [FreeTextEntry2] : Tatiana Agudelo

## 2024-06-07 NOTE — HISTORY OF PRESENT ILLNESS
[FreeTextEntry1] : This 67 yo presents c/o over about the last 2 weeks noting a dull ache to the lower pelvic area, non-localized with urinary frequency possible, unable to state if there is an odor to urine due to chronic sinus infection, no change to stooling pattern, no change to vaginal discharge or vaginal bleeding, denies any fever or chills, no backache.

## 2024-06-10 ENCOUNTER — OUTPATIENT (OUTPATIENT)
Dept: OUTPATIENT SERVICES | Facility: HOSPITAL | Age: 69
LOS: 1 days | End: 2024-06-10
Payer: MEDICARE

## 2024-06-10 ENCOUNTER — APPOINTMENT (OUTPATIENT)
Dept: ULTRASOUND IMAGING | Facility: CLINIC | Age: 69
End: 2024-06-10

## 2024-06-10 DIAGNOSIS — Z98.890 OTHER SPECIFIED POSTPROCEDURAL STATES: Chronic | ICD-10-CM

## 2024-06-10 DIAGNOSIS — Z90.710 ACQUIRED ABSENCE OF BOTH CERVIX AND UTERUS: Chronic | ICD-10-CM

## 2024-06-10 DIAGNOSIS — R10.2 PELVIC AND PERINEAL PAIN: ICD-10-CM

## 2024-06-10 LAB — BACTERIA UR CULT: NORMAL

## 2024-06-10 PROCEDURE — 76830 TRANSVAGINAL US NON-OB: CPT | Mod: 26

## 2024-06-10 PROCEDURE — 76830 TRANSVAGINAL US NON-OB: CPT

## 2024-07-30 ENCOUNTER — APPOINTMENT (OUTPATIENT)
Dept: OTOLARYNGOLOGY | Facility: CLINIC | Age: 69
End: 2024-07-30

## 2024-09-24 ENCOUNTER — OFFICE (OUTPATIENT)
Dept: URBAN - METROPOLITAN AREA CLINIC 100 | Facility: CLINIC | Age: 69
Setting detail: OPHTHALMOLOGY
End: 2024-09-24
Payer: MEDICARE

## 2024-09-24 DIAGNOSIS — H52.03: ICD-10-CM

## 2024-09-24 DIAGNOSIS — H53.2: ICD-10-CM

## 2024-09-24 DIAGNOSIS — H01.004: ICD-10-CM

## 2024-09-24 DIAGNOSIS — H49.12: ICD-10-CM

## 2024-09-24 DIAGNOSIS — H01.001: ICD-10-CM

## 2024-09-24 DIAGNOSIS — H16.223: ICD-10-CM

## 2024-09-24 DIAGNOSIS — H52.7: ICD-10-CM

## 2024-09-24 DIAGNOSIS — H50.22: ICD-10-CM

## 2024-09-24 PROBLEM — H11.153 PINGUECULA; BOTH EYES: Status: ACTIVE | Noted: 2024-09-24

## 2024-09-24 PROCEDURE — 92060 SENSORIMOTOR EXAMINATION: CPT | Performed by: OPHTHALMOLOGY

## 2024-09-24 PROCEDURE — 92015 DETERMINE REFRACTIVE STATE: CPT | Performed by: OPHTHALMOLOGY

## 2024-09-24 PROCEDURE — 99213 OFFICE O/P EST LOW 20 MIN: CPT | Performed by: OPHTHALMOLOGY

## 2024-09-24 ASSESSMENT — LID EXAM ASSESSMENTS
OS_BLEPHARITIS: LUL T
OD_BLEPHARITIS: RUL T

## 2024-09-24 ASSESSMENT — CONFRONTATIONAL VISUAL FIELD TEST (CVF)
OS_FINDINGS: FULL
OD_FINDINGS: FULL

## 2024-10-31 PROBLEM — H01.014 BLEPHARITIS; RIGHT UPPER LID, LEFT UPPER LID: Status: ACTIVE | Noted: 2024-10-31

## 2024-10-31 PROBLEM — H01.011 BLEPHARITIS; RIGHT UPPER LID, LEFT UPPER LID: Status: ACTIVE | Noted: 2024-10-31

## 2024-11-26 ENCOUNTER — APPOINTMENT (OUTPATIENT)
Dept: COLORECTAL SURGERY | Facility: CLINIC | Age: 69
End: 2024-11-26
Payer: MEDICARE

## 2024-11-26 DIAGNOSIS — K64.8 OTHER HEMORRHOIDS: ICD-10-CM

## 2024-11-26 PROCEDURE — 99204 OFFICE O/P NEW MOD 45 MIN: CPT | Mod: 25

## 2024-11-26 PROCEDURE — 46600 DIAGNOSTIC ANOSCOPY SPX: CPT

## 2024-12-11 ENCOUNTER — APPOINTMENT (OUTPATIENT)
Dept: OTOLARYNGOLOGY | Facility: CLINIC | Age: 69
End: 2024-12-11
Payer: MEDICARE

## 2024-12-11 VITALS
HEIGHT: 62 IN | HEART RATE: 69 BPM | WEIGHT: 107 LBS | SYSTOLIC BLOOD PRESSURE: 130 MMHG | BODY MASS INDEX: 19.69 KG/M2 | OXYGEN SATURATION: 97 % | DIASTOLIC BLOOD PRESSURE: 67 MMHG

## 2024-12-11 DIAGNOSIS — J32.4 CHRONIC PANSINUSITIS: ICD-10-CM

## 2024-12-11 DIAGNOSIS — Z14.1 CYSTIC FIBROSIS CARRIER: ICD-10-CM

## 2024-12-11 PROCEDURE — 99214 OFFICE O/P EST MOD 30 MIN: CPT | Mod: 25

## 2024-12-11 PROCEDURE — 31231 NASAL ENDOSCOPY DX: CPT

## 2024-12-11 RX ORDER — PREDNISONE 10 MG/1
10 TABLET ORAL
Qty: 18 | Refills: 0 | Status: ACTIVE | COMMUNITY
Start: 2024-12-11 | End: 1900-01-01

## 2024-12-11 RX ORDER — FLUTICASONE PROPIONATE 93 UG/1
93 SPRAY, METERED NASAL
Qty: 1 | Refills: 4 | Status: ACTIVE | COMMUNITY
Start: 2024-12-11 | End: 1900-01-01

## 2024-12-11 RX ORDER — AMOXICILLIN AND CLAVULANATE POTASSIUM 875; 125 MG/1; MG/1
875-125 TABLET, COATED ORAL
Qty: 20 | Refills: 0 | Status: ACTIVE | COMMUNITY
Start: 2024-12-11 | End: 1900-01-01

## 2024-12-12 ENCOUNTER — APPOINTMENT (OUTPATIENT)
Dept: ULTRASOUND IMAGING | Facility: CLINIC | Age: 69
End: 2024-12-12
Payer: MEDICARE

## 2024-12-12 ENCOUNTER — OUTPATIENT (OUTPATIENT)
Dept: OUTPATIENT SERVICES | Facility: HOSPITAL | Age: 69
LOS: 1 days | End: 2024-12-12
Payer: MEDICARE

## 2024-12-12 ENCOUNTER — APPOINTMENT (OUTPATIENT)
Dept: MAMMOGRAPHY | Facility: CLINIC | Age: 69
End: 2024-12-12
Payer: MEDICARE

## 2024-12-12 DIAGNOSIS — Z12.39 ENCOUNTER FOR OTHER SCREENING FOR MALIGNANT NEOPLASM OF BREAST: ICD-10-CM

## 2024-12-12 DIAGNOSIS — Z90.710 ACQUIRED ABSENCE OF BOTH CERVIX AND UTERUS: Chronic | ICD-10-CM

## 2024-12-12 DIAGNOSIS — Z98.890 OTHER SPECIFIED POSTPROCEDURAL STATES: Chronic | ICD-10-CM

## 2024-12-12 PROCEDURE — 77067 SCR MAMMO BI INCL CAD: CPT

## 2024-12-12 PROCEDURE — 76641 ULTRASOUND BREAST COMPLETE: CPT | Mod: 26,50,GA

## 2024-12-12 PROCEDURE — 77067 SCR MAMMO BI INCL CAD: CPT | Mod: 26

## 2024-12-12 PROCEDURE — 76641 ULTRASOUND BREAST COMPLETE: CPT

## 2024-12-12 PROCEDURE — 77063 BREAST TOMOSYNTHESIS BI: CPT | Mod: 26

## 2024-12-12 PROCEDURE — 77063 BREAST TOMOSYNTHESIS BI: CPT

## 2025-01-30 ENCOUNTER — APPOINTMENT (OUTPATIENT)
Dept: OTOLARYNGOLOGY | Facility: CLINIC | Age: 70
End: 2025-01-30
Payer: MEDICARE

## 2025-01-30 VITALS — WEIGHT: 110 LBS | BODY MASS INDEX: 20.24 KG/M2 | HEIGHT: 62 IN

## 2025-01-30 DIAGNOSIS — J33.9 NASAL POLYP, UNSPECIFIED: ICD-10-CM

## 2025-01-30 DIAGNOSIS — Z14.1 CYSTIC FIBROSIS CARRIER: ICD-10-CM

## 2025-01-30 DIAGNOSIS — J32.4 CHRONIC PANSINUSITIS: ICD-10-CM

## 2025-01-30 PROCEDURE — 31231 NASAL ENDOSCOPY DX: CPT

## 2025-01-30 PROCEDURE — 99214 OFFICE O/P EST MOD 30 MIN: CPT | Mod: 25

## 2025-01-30 RX ORDER — FLUCONAZOLE 100 MG/1
100 TABLET ORAL
Qty: 6 | Refills: 0 | Status: ACTIVE | COMMUNITY
Start: 2025-01-30 | End: 1900-01-01

## 2025-01-30 RX ORDER — AMOXICILLIN AND CLAVULANATE POTASSIUM 875; 125 MG/1; MG/1
875-125 TABLET, COATED ORAL
Qty: 20 | Refills: 0 | Status: ACTIVE | COMMUNITY
Start: 2025-01-30 | End: 1900-01-01

## 2025-01-30 RX ORDER — AZITHROMYCIN 250 MG/1
250 TABLET, FILM COATED ORAL DAILY
Qty: 45 | Refills: 0 | Status: ACTIVE | COMMUNITY
Start: 2025-01-30 | End: 1900-01-01

## 2025-01-31 ENCOUNTER — NON-APPOINTMENT (OUTPATIENT)
Age: 70
End: 2025-01-31

## 2025-03-20 ENCOUNTER — APPOINTMENT (OUTPATIENT)
Dept: OTOLARYNGOLOGY | Facility: CLINIC | Age: 70
End: 2025-03-20

## 2025-04-22 PROBLEM — H16.223 DRY EYE SYNDROME K SICCA; RIGHT EYE, LEFT EYE, BOTH EYES: Status: ACTIVE | Noted: 2025-04-22

## 2025-04-22 PROBLEM — H16.221 DRY EYE SYNDROME K SICCA; RIGHT EYE, LEFT EYE, BOTH EYES: Status: ACTIVE | Noted: 2025-04-22

## 2025-04-22 PROBLEM — H16.222 DRY EYE SYNDROME K SICCA; RIGHT EYE, LEFT EYE, BOTH EYES: Status: ACTIVE | Noted: 2025-04-22

## 2025-07-12 PROBLEM — H16.222 DRY EYE SYNDROME K SICCA; RIGHT EYE, LEFT EYE, BOTH EYES: Status: ACTIVE | Noted: 2025-07-12

## 2025-07-12 PROBLEM — H16.223 DRY EYE SYNDROME K SICCA; RIGHT EYE, LEFT EYE, BOTH EYES: Status: ACTIVE | Noted: 2025-07-12

## 2025-07-12 PROBLEM — H16.221 DRY EYE SYNDROME K SICCA; RIGHT EYE, LEFT EYE, BOTH EYES: Status: ACTIVE | Noted: 2025-07-12

## 2025-08-13 ENCOUNTER — APPOINTMENT (OUTPATIENT)
Dept: PEDIATRIC ALLERGY IMMUNOLOGY | Facility: CLINIC | Age: 70
End: 2025-08-13

## 2025-08-28 ENCOUNTER — NON-APPOINTMENT (OUTPATIENT)
Age: 70
End: 2025-08-28